# Patient Record
Sex: FEMALE | Race: ASIAN | NOT HISPANIC OR LATINO
[De-identification: names, ages, dates, MRNs, and addresses within clinical notes are randomized per-mention and may not be internally consistent; named-entity substitution may affect disease eponyms.]

---

## 2020-01-29 PROBLEM — Z00.00 ENCOUNTER FOR PREVENTIVE HEALTH EXAMINATION: Status: ACTIVE | Noted: 2020-01-29

## 2021-05-28 ENCOUNTER — RESULT REVIEW (OUTPATIENT)
Age: 71
End: 2021-05-28

## 2021-05-28 ENCOUNTER — OUTPATIENT (OUTPATIENT)
Dept: OUTPATIENT SERVICES | Facility: HOSPITAL | Age: 71
LOS: 1 days | End: 2021-05-28
Payer: MEDICAID

## 2021-05-28 ENCOUNTER — APPOINTMENT (OUTPATIENT)
Dept: ULTRASOUND IMAGING | Facility: IMAGING CENTER | Age: 71
End: 2021-05-28
Payer: MEDICARE

## 2021-05-28 DIAGNOSIS — Z00.8 ENCOUNTER FOR OTHER GENERAL EXAMINATION: ICD-10-CM

## 2021-05-28 PROCEDURE — 76536 US EXAM OF HEAD AND NECK: CPT | Mod: 26

## 2021-05-28 PROCEDURE — 76770 US EXAM ABDO BACK WALL COMP: CPT | Mod: 26

## 2021-05-28 PROCEDURE — 76770 US EXAM ABDO BACK WALL COMP: CPT

## 2021-05-28 PROCEDURE — 76536 US EXAM OF HEAD AND NECK: CPT

## 2021-06-16 ENCOUNTER — APPOINTMENT (OUTPATIENT)
Dept: ULTRASOUND IMAGING | Facility: IMAGING CENTER | Age: 71
End: 2021-06-16
Payer: MEDICARE

## 2021-06-16 ENCOUNTER — RESULT REVIEW (OUTPATIENT)
Age: 71
End: 2021-06-16

## 2021-06-16 ENCOUNTER — OUTPATIENT (OUTPATIENT)
Dept: OUTPATIENT SERVICES | Facility: HOSPITAL | Age: 71
LOS: 1 days | End: 2021-06-16
Payer: COMMERCIAL

## 2021-06-16 DIAGNOSIS — Z00.8 ENCOUNTER FOR OTHER GENERAL EXAMINATION: ICD-10-CM

## 2021-06-16 PROCEDURE — 88173 CYTOPATH EVAL FNA REPORT: CPT

## 2021-06-16 PROCEDURE — 88172 CYTP DX EVAL FNA 1ST EA SITE: CPT

## 2021-06-16 PROCEDURE — 10005 FNA BX W/US GDN 1ST LES: CPT

## 2021-06-16 PROCEDURE — 88173 CYTOPATH EVAL FNA REPORT: CPT | Mod: 26

## 2024-01-03 PROBLEM — M25.561 BILATERAL KNEE PAIN: Status: ACTIVE | Noted: 2024-01-03

## 2024-01-04 ENCOUNTER — APPOINTMENT (OUTPATIENT)
Dept: ORTHOPEDIC SURGERY | Facility: CLINIC | Age: 74
End: 2024-01-04
Payer: MEDICARE

## 2024-01-04 DIAGNOSIS — M17.11 UNILATERAL PRIMARY OSTEOARTHRITIS, RIGHT KNEE: ICD-10-CM

## 2024-01-04 DIAGNOSIS — M25.561 PAIN IN RIGHT KNEE: ICD-10-CM

## 2024-01-04 DIAGNOSIS — M17.0 BILATERAL PRIMARY OSTEOARTHRITIS OF KNEE: ICD-10-CM

## 2024-01-04 DIAGNOSIS — M25.562 PAIN IN RIGHT KNEE: ICD-10-CM

## 2024-01-04 DIAGNOSIS — M21.40 FLAT FOOT [PES PLANUS] (ACQUIRED), UNSPECIFIED FOOT: ICD-10-CM

## 2024-01-04 PROCEDURE — 99204 OFFICE O/P NEW MOD 45 MIN: CPT | Mod: 25

## 2024-01-04 PROCEDURE — 72170 X-RAY EXAM OF PELVIS: CPT

## 2024-01-04 PROCEDURE — 73564 X-RAY EXAM KNEE 4 OR MORE: CPT | Mod: RT

## 2024-01-07 NOTE — HISTORY OF PRESENT ILLNESS
[de-identified] : Jose Sharif is a 73-year-old female who presented, with her daughter, for evaluation of her bilateral knee pain.  Patient has been experiencing bilateral knee pain over the last few years.  Right knee is worse than the left.  Patient had increased walking few days ago and then experienced worsening pain and swelling.  She also had back pain.  Patient has difficulty with sitting, prior, walking, bending the knee, and with stairs.   Pain is located over the medial and lateral knee. Patient has tried diclofenac gel, with some relief.  She has tried Tylenol, with some relief. She has previously tried physical therapy, last in 2020 1/2022.  She has also tried corticosteroid injections, last in 2021, which only worked for 1 to 2 days.  No groin pain. She also reports bilateral foot numbness. Patient is French and English speaking.  History: Bilateral Pes Planus, CKD, HLD, Palpitations

## 2024-01-07 NOTE — DISCUSSION/SUMMARY
[de-identified] : Jose Sharif is a 73-year-old female who presents to the office for evaluation of her bilateral knee pain.  Patient has been experiencing bilateral (right greater than left) knee pain for the last few years.  She has tried physical therapy, pain control, and injections.  X-rays showed severe bilateral knee osteoarthritis.  Examination showed tenderness over the knees, but otherwise good knee range of motion. Discussed with patient the examination and imaging findings.  Discussed with patient the operative and nonoperative management of knee osteoarthritis, including total knee arthroplasty.  Discussed the nonoperative management of knee osteoarthritis, including physical therapy, anti-inflammatories, and injections.  Patient has tried nonoperative management, but continues to have knee pain.  Discussed total knee arthroplasty at length, including surgical procedure, hospital course, DVT prophylaxis, antibiotics, physical therapy, recovery, and risks. Patient would like to proceed with right total knee arthroplasty.  Discussed that patient will require medical clearance prior to surgery.  Discussed obtaining a CT scan prior to surgery.  Patient will follow-up in 4 weeks for reevaluation and management.  Patient understanding and in agreement with the plan.  All questions answered.   Discussed the imaging and physical exam findings with the patient consistent with endstage knee degenerative disease. The patient has failed conservative management including physical therapy, pain control, and injections.  The risks, benefits and alternatives to total knee replacement were discussed with the patient in detail and the patient elected to proceed with surgery. Discussed the surgical plan with the patient including implant options and surgical approach.   Surgical risks including fracture requiring fixation, instability or dislocation, temporary or permanent leg length inequality, infection, bleeding, stiffness, failure to alleviate pain, failure to achieve desired results, need for further surgery, scar tissue formation, hardware failure, chronic pain, injury to nerves resulting in extremity dysfunction, injury to arteries and veins, deep vein thrombosis or pulmonary embolism requiring anticoagulation and medical risk factors including heart attack, stroke, death, neurological injury, pneumonia, kidney or other organ failure were discussed with the patient.   Patient was understanding and in agreement with the treatment plan. All questions answered.  Plan: -CT Scan Right Lower Extremity -Medical Clearance -Follow up in 4 weeks for reevaluation and management -Right total knee arthroplasty  Surgical Plan: Diagnosis: Right Knee Osteoarthritis Laterality: Right Operative procedure: Right Total Knee Arthroplasty Location: [facility]  DVT prophylaxis: Aspirin 325mg twice per day TXA: IV Plan for discharge: Home Pre- & Post Operative Antibiotics: Cefazolin 2g  Clearances:      Medical: Pending  Comorbidities:      Metal Allergy: Negative      Chronic Pain: Negative      Diabetes: Negative       Use of Anticoagulation: Occasional Aspirin      Atrial Fibrillation: Negative      History of VTE: Negative      History of Cardiac Stents: Negative      Skin Infections/Open Wounds: Negative      MRSA Infection/Colonization: Negative      Current Urinary Symptoms: Negative      Immunocompromise: Negative      Inflammatory Arthritis: Negative      Smoking: Negative      Drug Use: Negative      Alcohol Use: Negative      Obstructive Sleep Apnea: Negative      Neurologic Disease: Negative   ADDENDUM: Patient was given a referral to Dr. Roy for follow-up of bilateral pes planus.

## 2024-01-07 NOTE — PHYSICAL EXAM
[de-identified] : Constitutional:  73 year old female, alert and oriented, cooperative, in no acute distress.  HEENT  NC/AT.  Appearance: symmetric  Chest/Respiratory  Respiratory effort: no intercostal retractions or use of accessory muscles. Nonlabored Breathing  Mental Status:  Judgment, insight: intact Orientation: oriented to time, place, and person  Left Knee  Inspection:     Skin intact, no rashes or lesions     No Effusion     Tenderness over the medial joint line and anterior knee.  Range of Motion: 	Extension - 0 degrees 	Flexion - 120 degrees 	Alignment - Varus 3 degrees 	Extensor lag: None  Stability:      Demonstrates no Varus or Valgus instability      Negative Anterior or Posterior drawer.      Negative Lachman's  Patella: stable, tracks well.   Right Knee  Inspection:     Skin intact, no rashes or lesions     No Effusion     Tenderness over the lateral joint line and anterior knee.  Range of Motion: 	Extension - 0 degrees 	Flexion - 120 degrees 	Alignment - Valgus 3 degrees 	Extensor lag: None  Stability:      Demonstrates no Varus or Valgus instability      Negative Anterior or Posterior drawer.      Negative Lachman's  Patella: stable, tracks well.   Neurologic Exam     Motor intact including 5/5 Extensor Hallucis Longus, 5/5 Flexor Hallucis Longus, 5/5 Tibialis Anterior and 5/5 Gastrocnemius     Sensation Intact to Light Touch including Saphenous, Sural, Superficial Peroneal, Deep Peroneal, Tibial nerve distributions  Vascular Exam     Foot is warm and well perfused with 2+ Dorsalis Pedis Pulse   No pain with range of motion of the left hip. No lumbar paraspinal muscle tenderness. There is right hip pain with right hip ROM. There is tenderness over the right greater trochanter. [de-identified] : XRay:  XRays of the Pelvis (1 View) taken in the office today and discussed with the patient. XRays demonstrate no obvious fracture or dislocation. There is no significant evidence of osteoarthritis or osteophyte formation. (my personal interpretation).   XRay: XRays of the Left Knee (4 Views) taken in the office today and reviewed with the patient. XRays demonstrate tricompartmental joint space narrowing, with bone on bone articulations in the medial compartment, with subchondral sclerosis, overlying osteophytes, all consistent with severe osteoarthritis, KL rdGrdrrdarddrderd:rd rd3rd. There is varus alignment. (my personal interpretation)   XRay: XRays of the Right Knee (4 Views) taken in the office today and reviewed with the patient. XRays demonstrate tricompartmental joint space narrowing, worse in the lateral compartment with subchondral sclerosis, overlying osteophytes, all consistent with severe osteoarthritis, KL thGthrthathdtheth:th th4th. There is valgus alignment. (my personal interpretation)

## 2024-01-08 ENCOUNTER — APPOINTMENT (OUTPATIENT)
Dept: RADIOLOGY | Facility: IMAGING CENTER | Age: 74
End: 2024-01-08
Payer: MEDICARE

## 2024-01-08 ENCOUNTER — RESULT REVIEW (OUTPATIENT)
Age: 74
End: 2024-01-08

## 2024-01-08 ENCOUNTER — OUTPATIENT (OUTPATIENT)
Dept: OUTPATIENT SERVICES | Facility: HOSPITAL | Age: 74
LOS: 1 days | End: 2024-01-08
Payer: COMMERCIAL

## 2024-01-08 ENCOUNTER — APPOINTMENT (OUTPATIENT)
Dept: CT IMAGING | Facility: IMAGING CENTER | Age: 74
End: 2024-01-08
Payer: MEDICARE

## 2024-01-08 DIAGNOSIS — M17.11 UNILATERAL PRIMARY OSTEOARTHRITIS, RIGHT KNEE: ICD-10-CM

## 2024-01-08 PROCEDURE — 73700 CT LOWER EXTREMITY W/O DYE: CPT

## 2024-01-08 PROCEDURE — 72170 X-RAY EXAM OF PELVIS: CPT

## 2024-01-08 PROCEDURE — 72170 X-RAY EXAM OF PELVIS: CPT | Mod: 26

## 2024-01-08 PROCEDURE — 73700 CT LOWER EXTREMITY W/O DYE: CPT | Mod: 26,RT

## 2024-01-08 PROCEDURE — 73564 X-RAY EXAM KNEE 4 OR MORE: CPT

## 2024-01-08 PROCEDURE — 73564 X-RAY EXAM KNEE 4 OR MORE: CPT | Mod: 26,LT,RT

## 2024-01-11 ENCOUNTER — APPOINTMENT (OUTPATIENT)
Dept: UROLOGY | Facility: CLINIC | Age: 74
End: 2024-01-11

## 2024-01-16 ENCOUNTER — APPOINTMENT (OUTPATIENT)
Dept: UROLOGY | Facility: CLINIC | Age: 74
End: 2024-01-16

## 2024-01-25 ENCOUNTER — APPOINTMENT (OUTPATIENT)
Dept: UROLOGY | Facility: CLINIC | Age: 74
End: 2024-01-25
Payer: MEDICARE

## 2024-01-25 VITALS
HEIGHT: 62 IN | BODY MASS INDEX: 31.65 KG/M2 | WEIGHT: 172 LBS | HEART RATE: 69 BPM | DIASTOLIC BLOOD PRESSURE: 97 MMHG | SYSTOLIC BLOOD PRESSURE: 128 MMHG | RESPIRATION RATE: 17 BRPM

## 2024-01-25 DIAGNOSIS — R39.16 STRAINING TO VOID: ICD-10-CM

## 2024-01-25 DIAGNOSIS — Z56.0 UNEMPLOYMENT, UNSPECIFIED: ICD-10-CM

## 2024-01-25 DIAGNOSIS — Z78.9 OTHER SPECIFIED HEALTH STATUS: ICD-10-CM

## 2024-01-25 DIAGNOSIS — Z63.4 DISAPPEARANCE AND DEATH OF FAMILY MEMBER: ICD-10-CM

## 2024-01-25 DIAGNOSIS — Q62.39 OTHER OBSTRUCTIVE DEFECTS OF RENAL PELVIS AND URETER: ICD-10-CM

## 2024-01-25 DIAGNOSIS — N13.30 UNSPECIFIED HYDRONEPHROSIS: ICD-10-CM

## 2024-01-25 PROCEDURE — 99204 OFFICE O/P NEW MOD 45 MIN: CPT

## 2024-01-25 RX ORDER — DICLOFENAC SODIUM 50 MG/1
50 TABLET, DELAYED RELEASE ORAL
Refills: 0 | Status: ACTIVE | COMMUNITY

## 2024-01-25 RX ORDER — LISINOPRIL AND HYDROCHLOROTHIAZIDE TABLETS 10; 12.5 MG/1; MG/1
10-12.5 TABLET ORAL
Refills: 0 | Status: ACTIVE | COMMUNITY

## 2024-01-25 RX ORDER — ACETAMINOPHEN 500 MG
500 TABLET ORAL
Refills: 0 | Status: ACTIVE | COMMUNITY

## 2024-01-25 RX ORDER — DICLOFENAC SODIUM 10 MG/G
1 GEL TOPICAL
Refills: 0 | Status: ACTIVE | COMMUNITY

## 2024-01-25 RX ORDER — ATORVASTATIN CALCIUM 10 MG/1
10 TABLET, FILM COATED ORAL
Refills: 0 | Status: ACTIVE | COMMUNITY

## 2024-01-25 RX ORDER — ESCITALOPRAM OXALATE 10 MG/1
10 TABLET ORAL
Refills: 0 | Status: ACTIVE | COMMUNITY

## 2024-01-25 RX ORDER — ASPIRIN 81 MG
81 TABLET, DELAYED RELEASE (ENTERIC COATED) ORAL
Refills: 0 | Status: ACTIVE | COMMUNITY

## 2024-01-25 RX ORDER — CHOLECALCIFEROL (VITAMIN D3) 1250 MCG
1.25 MG TABLET ORAL
Refills: 0 | Status: ACTIVE | COMMUNITY

## 2024-01-25 SDOH — SOCIAL STABILITY - SOCIAL INSECURITY: DISSAPEARANCE AND DEATH OF FAMILY MEMBER: Z63.4

## 2024-01-25 SDOH — ECONOMIC STABILITY - INCOME SECURITY: UNEMPLOYMENT, UNSPECIFIED: Z56.0

## 2024-01-30 NOTE — ASSESSMENT
[FreeTextEntry1] : If no clinically significant obstruction on renal scan, likely nothing to worry about. Also, in setting of some difference in renal split function, not always an immediate indicator for need of repair. Often, these incidental findings don't "matter" in that if renal fxn normal and stable, nothing to worry about as this is likely congenital, but if a functionally obstructive finding, may require monitoring vs immediate intervention, if at all, over time.  Will obtain nuc renal scan to assess for obstruction and to ck differential renal split.

## 2024-01-30 NOTE — LETTER BODY
[Dear  ___] : Dear  [unfilled], [Consult Letter:] : I had the pleasure of evaluating your patient, [unfilled]. [Please see my note below.] : Please see my note below. [Consult Closing:] : Thank you very much for allowing me to participate in the care of this patient.  If you have any questions, please do not hesitate to contact me. [FreeTextEntry1] : Please see my note. I will keep you informed. [FreeTextEntry3] : Sincerely,  Corinna Alfaro MD Clinical  [facility]

## 2024-01-30 NOTE — HISTORY OF PRESENT ILLNESS
[FreeTextEntry1] : KINDRA VARGAS is a 73 year old F who presents with c/o pain and straining to urinate.  This doesn't happen all the time and she does have h/o chronic constipation.  On 10/9/23, Cr was 1.12, and to her knowledge, her labs have been stable over time w no renal issues mentioned to her. UA showed a false pos for hematuria: No RBC's  ON 10/12/23 a ZAC at [facility] showed mild right hydro and a normal bladder.  There is no GH and she denies any prior episode of bleeding, or renal colic.  There is also no report of nausea, loss of appetite or h/o infections.  She then had a CTU done on 10/12/23 at [facility]: showing mild right hydro to a narrowing at upj, c/w upj obstruction. There is reportedly no mass in ureter or concerning filling defects though there is no disc for me to review at this time.

## 2024-02-02 ENCOUNTER — OUTPATIENT (OUTPATIENT)
Dept: OUTPATIENT SERVICES | Facility: HOSPITAL | Age: 74
LOS: 1 days | End: 2024-02-02

## 2024-02-02 VITALS
RESPIRATION RATE: 16 BRPM | SYSTOLIC BLOOD PRESSURE: 138 MMHG | OXYGEN SATURATION: 97 % | DIASTOLIC BLOOD PRESSURE: 84 MMHG | HEART RATE: 71 BPM | HEIGHT: 61 IN | WEIGHT: 166.89 LBS | TEMPERATURE: 98 F

## 2024-02-02 DIAGNOSIS — Z98.49 CATARACT EXTRACTION STATUS, UNSPECIFIED EYE: Chronic | ICD-10-CM

## 2024-02-02 DIAGNOSIS — M17.11 UNILATERAL PRIMARY OSTEOARTHRITIS, RIGHT KNEE: ICD-10-CM

## 2024-02-02 DIAGNOSIS — I10 ESSENTIAL (PRIMARY) HYPERTENSION: ICD-10-CM

## 2024-02-02 DIAGNOSIS — M17.10 UNILATERAL PRIMARY OSTEOARTHRITIS, UNSPECIFIED KNEE: ICD-10-CM

## 2024-02-02 LAB
A1C WITH ESTIMATED AVERAGE GLUCOSE RESULT: 5.4 % — SIGNIFICANT CHANGE UP (ref 4–5.6)
ANION GAP SERPL CALC-SCNC: 11 MMOL/L — SIGNIFICANT CHANGE UP (ref 7–14)
APPEARANCE UR: CLEAR — SIGNIFICANT CHANGE UP
BACTERIA # UR AUTO: NEGATIVE /HPF — SIGNIFICANT CHANGE UP
BILIRUB UR-MCNC: NEGATIVE — SIGNIFICANT CHANGE UP
BLD GP AB SCN SERPL QL: NEGATIVE — SIGNIFICANT CHANGE UP
BUN SERPL-MCNC: 20 MG/DL — SIGNIFICANT CHANGE UP (ref 7–23)
CALCIUM SERPL-MCNC: 9.7 MG/DL — SIGNIFICANT CHANGE UP (ref 8.4–10.5)
CAST: 0 /LPF — SIGNIFICANT CHANGE UP (ref 0–4)
CHLORIDE SERPL-SCNC: 104 MMOL/L — SIGNIFICANT CHANGE UP (ref 98–107)
CO2 SERPL-SCNC: 26 MMOL/L — SIGNIFICANT CHANGE UP (ref 22–31)
COLOR SPEC: YELLOW — SIGNIFICANT CHANGE UP
CREAT SERPL-MCNC: 1.05 MG/DL — SIGNIFICANT CHANGE UP (ref 0.5–1.3)
DIFF PNL FLD: ABNORMAL
EGFR: 57 ML/MIN/1.73M2 — LOW
ESTIMATED AVERAGE GLUCOSE: 108 — SIGNIFICANT CHANGE UP
GLUCOSE SERPL-MCNC: 83 MG/DL — SIGNIFICANT CHANGE UP (ref 70–99)
GLUCOSE UR QL: NEGATIVE MG/DL — SIGNIFICANT CHANGE UP
HCT VFR BLD CALC: 39.3 % — SIGNIFICANT CHANGE UP (ref 34.5–45)
HGB BLD-MCNC: 13.2 G/DL — SIGNIFICANT CHANGE UP (ref 11.5–15.5)
KETONES UR-MCNC: NEGATIVE MG/DL — SIGNIFICANT CHANGE UP
LEUKOCYTE ESTERASE UR-ACNC: NEGATIVE — SIGNIFICANT CHANGE UP
MCHC RBC-ENTMCNC: 29.5 PG — SIGNIFICANT CHANGE UP (ref 27–34)
MCHC RBC-ENTMCNC: 33.6 GM/DL — SIGNIFICANT CHANGE UP (ref 32–36)
MCV RBC AUTO: 87.9 FL — SIGNIFICANT CHANGE UP (ref 80–100)
NITRITE UR-MCNC: NEGATIVE — SIGNIFICANT CHANGE UP
NRBC # BLD: 0 /100 WBCS — SIGNIFICANT CHANGE UP (ref 0–0)
NRBC # FLD: 0 K/UL — SIGNIFICANT CHANGE UP (ref 0–0)
PH UR: 6.5 — SIGNIFICANT CHANGE UP (ref 5–8)
PLATELET # BLD AUTO: 212 K/UL — SIGNIFICANT CHANGE UP (ref 150–400)
POTASSIUM SERPL-MCNC: 4 MMOL/L — SIGNIFICANT CHANGE UP (ref 3.5–5.3)
POTASSIUM SERPL-SCNC: 4 MMOL/L — SIGNIFICANT CHANGE UP (ref 3.5–5.3)
PROT UR-MCNC: NEGATIVE MG/DL — SIGNIFICANT CHANGE UP
RBC # BLD: 4.47 M/UL — SIGNIFICANT CHANGE UP (ref 3.8–5.2)
RBC # FLD: 13.9 % — SIGNIFICANT CHANGE UP (ref 10.3–14.5)
RBC CASTS # UR COMP ASSIST: 9 /HPF — HIGH (ref 0–4)
RH IG SCN BLD-IMP: POSITIVE — SIGNIFICANT CHANGE UP
SODIUM SERPL-SCNC: 141 MMOL/L — SIGNIFICANT CHANGE UP (ref 135–145)
SP GR SPEC: 1.02 — SIGNIFICANT CHANGE UP (ref 1–1.03)
SQUAMOUS # UR AUTO: 2 /HPF — SIGNIFICANT CHANGE UP (ref 0–5)
UROBILINOGEN FLD QL: 0.2 MG/DL — SIGNIFICANT CHANGE UP (ref 0.2–1)
WBC # BLD: 5.81 K/UL — SIGNIFICANT CHANGE UP (ref 3.8–10.5)
WBC # FLD AUTO: 5.81 K/UL — SIGNIFICANT CHANGE UP (ref 3.8–10.5)
WBC UR QL: 1 /HPF — SIGNIFICANT CHANGE UP (ref 0–5)

## 2024-02-02 RX ORDER — CIPROFLOXACIN HCL 0.3 %
1 DROPS OPHTHALMIC (EYE)
Refills: 0 | DISCHARGE

## 2024-02-02 RX ORDER — SODIUM CHLORIDE 9 MG/ML
1000 INJECTION, SOLUTION INTRAVENOUS
Refills: 0 | Status: DISCONTINUED | OUTPATIENT
Start: 2024-02-07 | End: 2024-02-08

## 2024-02-02 RX ORDER — CHLORHEXIDINE GLUCONATE 213 G/1000ML
1 SOLUTION TOPICAL DAILY
Refills: 0 | Status: DISCONTINUED | OUTPATIENT
Start: 2024-02-07 | End: 2024-02-08

## 2024-02-02 NOTE — H&P PST ADULT - NSICDXPASTMEDICALHX_GEN_ALL_CORE_FT
PAST MEDICAL HISTORY:  Arthritis     Hypertension      PAST MEDICAL HISTORY:  Arthritis     Cataract, left     Hypertension     Obesity

## 2024-02-02 NOTE — H&P PST ADULT - ASSESSMENT
74 y/o female with hx of  bilateral knee pain x several years.  Dx with osteoarthritis.  Scheduled for right Total knee Arthoplasty with TERESA

## 2024-02-02 NOTE — H&P PST ADULT - LANGUAGE ASSISTANCE NEEDED
pt prefers to communicate in english/No-Patient/Caregiver offered and refused free interpretation services. No-Patient/Caregiver offered and refused free interpretation services.

## 2024-02-02 NOTE — H&P PST ADULT - ATTENDING COMMENTS
Jose Sharif is a 73-year-old female, past medical history of pes planus, CKD, hyperlipidemia, palpitations, who presented for evaluation of her bilateral (right greater than left) knee pain. Patient has been experiencing bilateral knee pain over the last few years. Patient has difficulty with sitting, prior, walking, bending the knee, and with stairs. Pain is located over the medial and lateral knee. Patient has tried diclofenac gel, with some relief. She has tried Tylenol, with some relief. She has previously tried physical therapy and tried corticosteroid injections. XRays showed severe right knee osteoarthritis. Patient was indicated for a Right Total Knee Arthroplasty. Patient was cleared by Medicine.    Discussed the operative and nonoperative management of knee osteoarthritis at length with the patient. Nonoperative management would consist of pain control, physical therapy, and anti-inflammatories. The patient had failed conservative management, including pain control, physical therapy, and injections. The patient did not want to continue nonoperative management due to the impact knee pain has had on the patient’s quality of life. Operative management would consist of total knee arthroplasty. The risks, benefits and alternatives to total knee arthroplasty were discussed with the patient in detail and the patient elected to proceed with surgery. Discussed the surgical plan and implants with the patient, including robotic assisted total knee arthroplasty. Discussed the recovery process following total knee arthroplasty at length, including, but not limited to, inpatient hospital stay, physical therapy, recovery, antibiotics, and DVT prophylaxis. Surgical risks including fracture requiring fixation, instability or dislocation, temporary or permanent leg length inequality, risks of cementation, infection, bleeding, stiffness, failure to alleviate pain, failure to achieve desired results, need for further surgery, scar tissue formation, hardware failure, component loosening, chronic pain, injury to nerves resulting in extremity dysfunction, injury to arteries and veins, deep vein thrombosis or pulmonary embolism requiring anticoagulation and medical risk factors including heart attack, stroke, death, neurological injury, pneumonia, kidney or other organ failure were discussed with the patient at length.    Following discussion, patient elected to proceed with Right Total Knee Arthroplasty with [employer] Robotic Assistance. Informed consent was obtained. Patient's leg was then marked with verbal confirmation of the patient. Patient was understanding and in agreement with the operative plan. All questions were answered.      Assessment/Plan:  Jose Sharif is a 73 year old female who presented for a Right Total Knee Arthroplasty with [employer] Assistance    Plan:  -Right Total Knee Arthroplasty with [employer] Assistance  -Clearance in Chart

## 2024-02-02 NOTE — H&P PST ADULT - NSANTHOSAYNRD_GEN_A_CORE
No. GILL screening performed.  STOP BANG Legend: 0-2 = LOW Risk; 3-4 = INTERMEDIATE Risk; 5-8 = HIGH Risk

## 2024-02-02 NOTE — H&P PST ADULT - HISTORY OF PRESENT ILLNESS
72 y/o female with hx of  bilateral knee pain x several years.  Dx with osteoarthritis.  Scheduled for right Total knee Arthoplasty with TERESA

## 2024-02-02 NOTE — H&P PST ADULT - CARDIOVASCULAR COMMENTS
DASI score METS 4.30 without CP no SOB, no palpitations DASI score 4.30 METS without CP no SOB, no palpitations

## 2024-02-02 NOTE — H&P PST ADULT - PROBLEM SELECTOR PLAN 1
Right Total knee Arthoplasty with TERESA 2/7/24    CBC BMP HgbA1C T&S , ABO, MRSA swab, UA CS EKG on chart    preop Ortho surg instructions given and explained    Preop instructions and antibacterial soap given and explained (verbal and written), with teach back.     GILL precautions, OR booking informed    Pt reports she was seen by PMD for preop eval, on chart

## 2024-02-03 LAB
CULTURE RESULTS: SIGNIFICANT CHANGE UP
MRSA PCR RESULT.: SIGNIFICANT CHANGE UP
RH IG SCN BLD-IMP: POSITIVE — SIGNIFICANT CHANGE UP
S AUREUS DNA NOSE QL NAA+PROBE: SIGNIFICANT CHANGE UP
SPECIMEN SOURCE: SIGNIFICANT CHANGE UP

## 2024-02-06 ENCOUNTER — TRANSCRIPTION ENCOUNTER (OUTPATIENT)
Age: 74
End: 2024-02-06

## 2024-02-06 NOTE — ASU PATIENT PROFILE, ADULT - FALL HARM RISK - HARM RISK INTERVENTIONS

## 2024-02-07 ENCOUNTER — INPATIENT (INPATIENT)
Facility: HOSPITAL | Age: 74
LOS: 0 days | Discharge: HOME HEALTH | End: 2024-02-08
Attending: STUDENT IN AN ORGANIZED HEALTH CARE EDUCATION/TRAINING PROGRAM | Admitting: STUDENT IN AN ORGANIZED HEALTH CARE EDUCATION/TRAINING PROGRAM
Payer: MEDICARE

## 2024-02-07 ENCOUNTER — TRANSCRIPTION ENCOUNTER (OUTPATIENT)
Age: 74
End: 2024-02-07

## 2024-02-07 ENCOUNTER — APPOINTMENT (OUTPATIENT)
Dept: ORTHOPEDIC SURGERY | Facility: HOSPITAL | Age: 74
End: 2024-02-07

## 2024-02-07 VITALS
OXYGEN SATURATION: 100 % | SYSTOLIC BLOOD PRESSURE: 153 MMHG | RESPIRATION RATE: 16 BRPM | TEMPERATURE: 98 F | DIASTOLIC BLOOD PRESSURE: 80 MMHG | HEART RATE: 73 BPM | HEIGHT: 61 IN | WEIGHT: 166.89 LBS

## 2024-02-07 DIAGNOSIS — M17.11 UNILATERAL PRIMARY OSTEOARTHRITIS, RIGHT KNEE: ICD-10-CM

## 2024-02-07 DIAGNOSIS — Z98.49 CATARACT EXTRACTION STATUS, UNSPECIFIED EYE: Chronic | ICD-10-CM

## 2024-02-07 PROBLEM — I10 ESSENTIAL (PRIMARY) HYPERTENSION: Chronic | Status: ACTIVE | Noted: 2024-02-02

## 2024-02-07 PROBLEM — E66.9 OBESITY, UNSPECIFIED: Chronic | Status: ACTIVE | Noted: 2024-02-02

## 2024-02-07 PROBLEM — M19.90 UNSPECIFIED OSTEOARTHRITIS, UNSPECIFIED SITE: Chronic | Status: ACTIVE | Noted: 2024-02-02

## 2024-02-07 LAB — GLUCOSE BLDC GLUCOMTR-MCNC: 102 MG/DL — HIGH (ref 70–99)

## 2024-02-07 PROCEDURE — 0055T BONE SRGRY CMPTR CT/MRI IMAG: CPT

## 2024-02-07 PROCEDURE — 27447 TOTAL KNEE ARTHROPLASTY: CPT | Mod: RT

## 2024-02-07 PROCEDURE — 73560 X-RAY EXAM OF KNEE 1 OR 2: CPT | Mod: 26,RT

## 2024-02-07 DEVICE — CEMENT SIMPLEX P 40GM: Type: IMPLANTABLE DEVICE | Site: RIGHT | Status: FUNCTIONAL

## 2024-02-07 DEVICE — BASEPLATE TIB UNIV TRIATHLON SZ 2: Type: IMPLANTABLE DEVICE | Site: RIGHT | Status: FUNCTIONAL

## 2024-02-07 DEVICE — INSERT TIB BEARING CS X3 SZ 2 11MM: Type: IMPLANTABLE DEVICE | Site: RIGHT | Status: FUNCTIONAL

## 2024-02-07 DEVICE — MAKO BONE PIN 3.2MM X 140MM: Type: IMPLANTABLE DEVICE | Site: RIGHT | Status: FUNCTIONAL

## 2024-02-07 DEVICE — COMP FEM TRIATHLON CR SZ 2 RT: Type: IMPLANTABLE DEVICE | Site: RIGHT | Status: FUNCTIONAL

## 2024-02-07 DEVICE — IMP PATELLA ASYMMETRIC X3 29X9MM: Type: IMPLANTABLE DEVICE | Site: RIGHT | Status: FUNCTIONAL

## 2024-02-07 DEVICE — MAKO BONE PIN 3.2MM X 110MM: Type: IMPLANTABLE DEVICE | Site: RIGHT | Status: FUNCTIONAL

## 2024-02-07 RX ORDER — OXYCODONE HYDROCHLORIDE 5 MG/1
10 TABLET ORAL
Refills: 0 | Status: DISCONTINUED | OUTPATIENT
Start: 2024-02-07 | End: 2024-02-08

## 2024-02-07 RX ORDER — SODIUM CHLORIDE 9 MG/ML
500 INJECTION, SOLUTION INTRAVENOUS ONCE
Refills: 0 | Status: COMPLETED | OUTPATIENT
Start: 2024-02-07 | End: 2024-02-07

## 2024-02-07 RX ORDER — POLYETHYLENE GLYCOL 3350 17 G/17G
17 POWDER, FOR SOLUTION ORAL AT BEDTIME
Refills: 0 | Status: DISCONTINUED | OUTPATIENT
Start: 2024-02-07 | End: 2024-02-08

## 2024-02-07 RX ORDER — PANTOPRAZOLE SODIUM 20 MG/1
40 TABLET, DELAYED RELEASE ORAL ONCE
Refills: 0 | Status: COMPLETED | OUTPATIENT
Start: 2024-02-07 | End: 2024-02-07

## 2024-02-07 RX ORDER — ONDANSETRON 8 MG/1
4 TABLET, FILM COATED ORAL EVERY 6 HOURS
Refills: 0 | Status: DISCONTINUED | OUTPATIENT
Start: 2024-02-07 | End: 2024-02-08

## 2024-02-07 RX ORDER — TRAMADOL HYDROCHLORIDE 50 MG/1
50 TABLET ORAL ONCE
Refills: 0 | Status: DISCONTINUED | OUTPATIENT
Start: 2024-02-07 | End: 2024-02-07

## 2024-02-07 RX ORDER — ACETAMINOPHEN 500 MG
1000 TABLET ORAL ONCE
Refills: 0 | Status: COMPLETED | OUTPATIENT
Start: 2024-02-07 | End: 2024-02-07

## 2024-02-07 RX ORDER — KETOROLAC TROMETHAMINE 0.5 %
1 DROPS OPHTHALMIC (EYE)
Refills: 0 | Status: DISCONTINUED | OUTPATIENT
Start: 2024-02-07 | End: 2024-02-08

## 2024-02-07 RX ORDER — CEFAZOLIN SODIUM 1 G
2000 VIAL (EA) INJECTION EVERY 8 HOURS
Refills: 0 | Status: COMPLETED | OUTPATIENT
Start: 2024-02-07 | End: 2024-02-08

## 2024-02-07 RX ORDER — TRAMADOL HYDROCHLORIDE 50 MG/1
50 TABLET ORAL EVERY 6 HOURS
Refills: 0 | Status: DISCONTINUED | OUTPATIENT
Start: 2024-02-07 | End: 2024-02-08

## 2024-02-07 RX ORDER — SODIUM CHLORIDE 9 MG/ML
500 INJECTION, SOLUTION INTRAVENOUS ONCE
Refills: 0 | Status: COMPLETED | OUTPATIENT
Start: 2024-02-07 | End: 2024-02-08

## 2024-02-07 RX ORDER — SENNA PLUS 8.6 MG/1
2 TABLET ORAL AT BEDTIME
Refills: 0 | Status: DISCONTINUED | OUTPATIENT
Start: 2024-02-07 | End: 2024-02-08

## 2024-02-07 RX ORDER — ATORVASTATIN CALCIUM 80 MG/1
10 TABLET, FILM COATED ORAL AT BEDTIME
Refills: 0 | Status: DISCONTINUED | OUTPATIENT
Start: 2024-02-07 | End: 2024-02-08

## 2024-02-07 RX ORDER — ASPIRIN/CALCIUM CARB/MAGNESIUM 324 MG
325 TABLET ORAL
Refills: 0 | Status: DISCONTINUED | OUTPATIENT
Start: 2024-02-07 | End: 2024-02-08

## 2024-02-07 RX ORDER — ONDANSETRON 8 MG/1
4 TABLET, FILM COATED ORAL ONCE
Refills: 0 | Status: DISCONTINUED | OUTPATIENT
Start: 2024-02-07 | End: 2024-02-07

## 2024-02-07 RX ORDER — LISINOPRIL 2.5 MG/1
10 TABLET ORAL DAILY
Refills: 0 | Status: DISCONTINUED | OUTPATIENT
Start: 2024-02-07 | End: 2024-02-08

## 2024-02-07 RX ORDER — ACETAMINOPHEN 500 MG
975 TABLET ORAL EVERY 8 HOURS
Refills: 0 | Status: DISCONTINUED | OUTPATIENT
Start: 2024-02-08 | End: 2024-02-08

## 2024-02-07 RX ORDER — PREDNISOLONE SODIUM PHOSPHATE 1 %
1 DROPS OPHTHALMIC (EYE)
Refills: 0 | Status: DISCONTINUED | OUTPATIENT
Start: 2024-02-07 | End: 2024-02-08

## 2024-02-07 RX ORDER — PANTOPRAZOLE SODIUM 20 MG/1
40 TABLET, DELAYED RELEASE ORAL
Refills: 0 | Status: DISCONTINUED | OUTPATIENT
Start: 2024-02-07 | End: 2024-02-08

## 2024-02-07 RX ORDER — DEXAMETHASONE 0.5 MG/5ML
8 ELIXIR ORAL ONCE
Refills: 0 | Status: COMPLETED | OUTPATIENT
Start: 2024-02-08 | End: 2024-02-08

## 2024-02-07 RX ORDER — KETOROLAC TROMETHAMINE 30 MG/ML
15 SYRINGE (ML) INJECTION EVERY 6 HOURS
Refills: 0 | Status: DISCONTINUED | OUTPATIENT
Start: 2024-02-07 | End: 2024-02-08

## 2024-02-07 RX ORDER — OXYCODONE HYDROCHLORIDE 5 MG/1
5 TABLET ORAL ONCE
Refills: 0 | Status: DISCONTINUED | OUTPATIENT
Start: 2024-02-07 | End: 2024-02-07

## 2024-02-07 RX ORDER — HYDROMORPHONE HYDROCHLORIDE 2 MG/ML
0.5 INJECTION INTRAMUSCULAR; INTRAVENOUS; SUBCUTANEOUS
Refills: 0 | Status: DISCONTINUED | OUTPATIENT
Start: 2024-02-07 | End: 2024-02-07

## 2024-02-07 RX ORDER — HYDROMORPHONE HYDROCHLORIDE 2 MG/ML
0.5 INJECTION INTRAMUSCULAR; INTRAVENOUS; SUBCUTANEOUS ONCE
Refills: 0 | Status: DISCONTINUED | OUTPATIENT
Start: 2024-02-07 | End: 2024-02-08

## 2024-02-07 RX ORDER — ACETAMINOPHEN 500 MG
1000 TABLET ORAL ONCE
Refills: 0 | Status: COMPLETED | OUTPATIENT
Start: 2024-02-08 | End: 2024-02-08

## 2024-02-07 RX ORDER — OXYCODONE HYDROCHLORIDE 5 MG/1
5 TABLET ORAL
Refills: 0 | Status: DISCONTINUED | OUTPATIENT
Start: 2024-02-07 | End: 2024-02-08

## 2024-02-07 RX ADMIN — SODIUM CHLORIDE 30 MILLILITER(S): 9 INJECTION, SOLUTION INTRAVENOUS at 07:40

## 2024-02-07 RX ADMIN — Medication 400 MILLIGRAM(S): at 18:37

## 2024-02-07 RX ADMIN — Medication 15 MILLIGRAM(S): at 17:03

## 2024-02-07 RX ADMIN — SODIUM CHLORIDE 500 MILLILITER(S): 9 INJECTION, SOLUTION INTRAVENOUS at 17:04

## 2024-02-07 RX ADMIN — CHLORHEXIDINE GLUCONATE 1 APPLICATION(S): 213 SOLUTION TOPICAL at 07:02

## 2024-02-07 RX ADMIN — Medication 15 MILLIGRAM(S): at 23:37

## 2024-02-07 RX ADMIN — SODIUM CHLORIDE 500 MILLILITER(S): 9 INJECTION, SOLUTION INTRAVENOUS at 11:30

## 2024-02-07 RX ADMIN — Medication 15 MILLIGRAM(S): at 17:20

## 2024-02-07 RX ADMIN — Medication 100 MILLIGRAM(S): at 17:03

## 2024-02-07 RX ADMIN — Medication 1 DROP(S): at 14:20

## 2024-02-07 RX ADMIN — Medication 1000 MILLIGRAM(S): at 18:57

## 2024-02-07 RX ADMIN — OXYCODONE HYDROCHLORIDE 5 MILLIGRAM(S): 5 TABLET ORAL at 12:04

## 2024-02-07 RX ADMIN — Medication 1 DROP(S): at 22:08

## 2024-02-07 RX ADMIN — PANTOPRAZOLE SODIUM 40 MILLIGRAM(S): 20 TABLET, DELAYED RELEASE ORAL at 06:59

## 2024-02-07 RX ADMIN — Medication 325 MILLIGRAM(S): at 18:37

## 2024-02-07 RX ADMIN — TRAMADOL HYDROCHLORIDE 50 MILLIGRAM(S): 50 TABLET ORAL at 07:00

## 2024-02-07 RX ADMIN — OXYCODONE HYDROCHLORIDE 5 MILLIGRAM(S): 5 TABLET ORAL at 13:00

## 2024-02-07 RX ADMIN — SODIUM CHLORIDE 30 MILLILITER(S): 9 INJECTION, SOLUTION INTRAVENOUS at 17:05

## 2024-02-07 NOTE — DISCHARGE NOTE PROVIDER - NSDCCPCAREPLAN_GEN_ALL_CORE_FT
PRINCIPAL DISCHARGE DIAGNOSIS  Diagnosis: Primary osteoarthritis of knee  Assessment and Plan of Treatment: Diet: Continue regular diet upon discharge.   Activity: No heavy lifting > 25 lbs for 4 weeks. Avoid straining or excessive activity x 6 weeks.   -Continue to use your walker when ambulating until your postoperative follow up appointment.   Dressings: Keep dressing clean, dry, and intact. Your doctor will remove your bandage at your post-operative follow up appointment.   Other Care:   -You may shower when you get home but DO NOT soak dressing and/or incision. The water may run over your dressing/incision but DO NOT let the water directly hit your dressing/incision (take a shower with your wound away from the direct stream of water). NO hot tubes, NO bath rubs, NO swimming pools.   -Elevate your operative leg 2 feet above heart level for 2 hours in the morning, 2 hours in the afternoon, and 2 hours in the evening.   -Apply ice for 20min every time you elevate.   -Sit for 90 min/day: 45mins x2 or 30min x3  -DO NOT sit for more than the 90min/day. Walk or lay down when not elevating your leg.   -DO NOT place the elevation pillow behind your knees. Only place it under your calf and heel.   -DO NOT bend more than 45 degrees at the waist

## 2024-02-07 NOTE — DISCHARGE NOTE PROVIDER - NSDCFUSCHEDAPPT_GEN_ALL_CORE_FT
Vivek Blue  [facility] Physician [facility]  ORTHOSUR [address] [employer]  Scheduled Appointment: 02/22/2024

## 2024-02-07 NOTE — PHYSICAL THERAPY INITIAL EVALUATION ADULT - RANGE OF MOTION EXAMINATION, REHAB EVAL
right hip/ankle: within functional limits, right knee flexion, 0-90 degrees/bilateral upper extremity ROM was WFL (within functional limits)/Left LE ROM was WFL (within functional limits)/deficits as listed below

## 2024-02-07 NOTE — PHYSICAL THERAPY INITIAL EVALUATION ADULT - NSPTDISCHREC_GEN_A_CORE
Anticipate discharge home however at this time patient is not yet functionally cleared from Physical Therapy perspective due to stair mobility and car transfers. Home with home PT services to address current functional limitations to optimize safety within the home environment with the use of a rolling walker./Home PT

## 2024-02-07 NOTE — PHYSICAL THERAPY INITIAL EVALUATION ADULT - ADDITIONAL COMMENTS
Pt lives with her daughter in a house with 2 stairs to enter and 10 steps to the bedroom. prior to admission Pt stated that she was independent with all mobility and ambulated with a rollator.     Pt. left comfortable in bed with all tubes/lines intact, head of the bed elevated, +bed alarm, call bell in reach and in NAD. RN aware of session and pt current position.

## 2024-02-07 NOTE — DISCHARGE NOTE PROVIDER - NSDCCPTREATMENT_GEN_ALL_CORE_FT
PRINCIPAL PROCEDURE  Procedure: Right total knee replacement  Findings and Treatment: Pain control:    Standing:         -Acetaminophen 500mg - 2 tabs every 8 hours  As needed:        -Tramadol 50mg - 1 tab every 6 hours - Take only if needed for MODERATE pain       -oxycodone 5mg - 1 tab every 4-6 hours - Take only if needed for SEVERE or BREAKTHROUGH pain  Oxycodone and Tramadol have been sent to your pharmacy. Please do not drive, operate machinery, or make important decisions while taking these medications.   Other Medications: (Standing)  -Aspirin (Enteric Coated) 325mg every 12 hours - to prevent blood clots (for 6 weeks post operatively.)  -Protonix 40mg - 1 tab every 24 hours - to prevent stomach irritation/ulcers  -Senna 8.6mg - 2 pills every 24 hours - stool softener  -Miralax 17g - daily - constipation   Follow up: Please follow up at your prescheduled post-operative follow up appointment with Dr. Blue for 2 weeks after hospital discharge. Please call with any questions or concerns including fevers, worsening pain, pus from the wounds, redness of the skin and difficulty breathing or heaviness in the chest at [phone].

## 2024-02-07 NOTE — DISCHARGE NOTE PROVIDER - NSDCMRMEDTOKEN_GEN_ALL_CORE_FT
aspirin 81 mg oral tablet: 1 tab(s) orally once a day last dose 1 week ago  atorvastatin 10 mg oral tablet: 1 tab(s) orally  cholecalciferol 1250 mcg (50,000 intl units) oral capsule: 1 cap(s) orally once a week  diclofenac sodium 50 mg oral delayed release tablet: 1 tab(s) orally every 12 hours last dose 1 week ago  ketorolac 0.5% ophthalmic solution: 1 drop(s) in each affected eye 2 times a day left eye  lisinopril-hydrochlorothiazide 10 mg-12.5 mg oral tablet: 1 tab(s) orally once a day in PM  prednisoLONE acetate 1% ophthalmic suspension: 1 drop(s) in each affected eye 4 times a day left eye   acetaminophen 325 mg oral tablet: 3 tab(s) orally every 8 hours  aspirin 325 mg oral tablet: 1 tab(s) orally 2 times a day  atorvastatin 10 mg oral tablet: 1 tab(s) orally  cholecalciferol 1250 mcg (50,000 intl units) oral capsule: 1 cap(s) orally once a week  ketorolac 0.5% ophthalmic solution: 1 drop(s) in each affected eye 2 times a day left eye  lisinopril 10 mg oral tablet: 1 tab(s) orally once a day  lisinopril-hydrochlorothiazide 10 mg-12.5 mg oral tablet: 1 tab(s) orally once a day in PM  naloxone 4 mg/0.1 mL nasal spray: 4 milligram(s) intranasally once as needed for opioid reversal  naproxen 500 mg oral delayed release tablet: 1 tab(s) orally 2 times a day  oxyCODONE 5 mg oral tablet: 1 tab(s) orally every 6 hours as needed for Severe Pain (7 - 10) MDD: 4  pantoprazole 40 mg oral delayed release tablet: 1 tab(s) orally once a day (before a meal)  polyethylene glycol 3350 oral powder for reconstitution: 17 gram(s) orally once a day (at bedtime)  prednisoLONE acetate 1% ophthalmic suspension: 1 drop(s) in each affected eye 4 times a day left eye  senna leaf extract oral tablet: 2 tab(s) orally once a day (at bedtime)  traMADol 50 mg oral tablet: 1 tab(s) orally every 6 hours as needed for Mild Pain (1 - 3) MDD: 4

## 2024-02-07 NOTE — DISCHARGE NOTE PROVIDER - HOSPITAL COURSE
This is a 74yo Female with PMH of HTN who presents to [facility] for orthopedic surgery. Patient s/p right TKA with Dr. Blue on 2/7/2024. Patient tolerated the procedure well without any intraoperative complications. Patient tolerated physical therapy well, and the pain was controlled. Patient is weight bearing as tolerated with cane/walker as needed. Seen by medical attending for continuity of care and management and cleared for safe discharge. Keep dressing/incision clean, dry and intact. Any suture/staples to be removed on post-op day #14 at your office visit. Patient is on 325mg of ASA for DVT prophylaxis, please take for 6 weeks unless otherwise instructed by your surgeon. Please follow up with Dr. Blue in 2 weeks. Please follow up with your PMD for continuity of care and management as medications may have changed.

## 2024-02-07 NOTE — ASU PREOP CHECKLIST - SELECT TESTS ORDERED
fs at/BMP/CBC/Type and Cross/Type and Screen/POCT Blood Glucose fs at 6:22 am-102/BMP/CBC/Type and Cross/Type and Screen/POCT Blood Glucose

## 2024-02-07 NOTE — PHYSICAL THERAPY INITIAL EVALUATION ADULT - MANUAL MUSCLE TESTING RESULTS, REHAB EVAL
bilateral upper extremities: at least 3/5, left lower extremity: at least 3/5, right lower extremity: 3-/5/grossly assessed due to

## 2024-02-07 NOTE — PATIENT PROFILE ADULT - FALL HARM RISK - HARM RISK INTERVENTIONS
Assistance with ambulation/Assistance OOB with selected safe patient handling equipment/Communicate Risk of Fall with Harm to all staff/Discuss with provider need for PT consult/Monitor gait and stability/Provide patient with walking aids - walker, cane, crutches/Reinforce activity limits and safety measures with patient and family/Sit up slowly, dangle for a short time, stand at bedside before walking/Tailored Fall Risk Interventions/Use of alarms - bed, chair and/or voice tab/Visual Cue: Yellow wristband and red socks/Bed in lowest position, wheels locked, appropriate side rails in place/Call bell, personal items and telephone in reach/Instruct patient to call for assistance before getting out of bed or chair/Non-slip footwear when patient is out of bed/[place] to call system/Physically safe environment - no spills, clutter or unnecessary equipment/Purposeful Proactive Rounding/Room/bathroom lighting operational, light cord in reach

## 2024-02-07 NOTE — PHYSICAL THERAPY INITIAL EVALUATION ADULT - PHYSICAL ASSIST/NONPHYSICAL ASSIST: SIT/SUPINE, REHAB EVAL
Ss note:9/9/2021.3:54 PM  Pt is COVID POSITIVE on 9-8-2021. ACP completed. Pt is from [facility] [place] [facility]AVEZ and is unable to return to this SNF until 10 days post covid diagnosis per diaz Thompson Adjutant. SW/CM had investigated possible SNF options for new positive covid pts however [facility] full, liaison will follow, Benjie Nur, [address] [id number] and [facility] cannot accept. Dtr Myron Rahman was updated on above and relays she was paying to hold the bed at [facility] prior to pt becoming covid positive. Sw contacted diaz Fowlerutant at [facility] to reach out to the dtr Myron Rahman as dtr indicated she wants to continue to pay to hold the bed at [facility]. Sw will await return call from [employer] on status of bed hold. SW/CM will continue to follow and assist for SNF placement.  LD Raman verbal cues

## 2024-02-07 NOTE — DISCHARGE NOTE PROVIDER - CARE PROVIDER_API CALL
Vivek Blue  Orthopaedic Surgery  [address] [place] [address], [facility] [address]  [place] [address]  Phone: [phone]  Fax: [phone]  Established Patient  Follow Up Time:

## 2024-02-08 ENCOUNTER — TRANSCRIPTION ENCOUNTER (OUTPATIENT)
Age: 74
End: 2024-02-08

## 2024-02-08 VITALS
TEMPERATURE: 97 F | DIASTOLIC BLOOD PRESSURE: 64 MMHG | HEART RATE: 95 BPM | RESPIRATION RATE: 17 BRPM | OXYGEN SATURATION: 98 % | SYSTOLIC BLOOD PRESSURE: 128 MMHG

## 2024-02-08 PROBLEM — H26.9 UNSPECIFIED CATARACT: Chronic | Status: ACTIVE | Noted: 2024-02-02

## 2024-02-08 LAB
ANION GAP SERPL CALC-SCNC: 11 MMOL/L — SIGNIFICANT CHANGE UP (ref 7–14)
BUN SERPL-MCNC: 13 MG/DL — SIGNIFICANT CHANGE UP (ref 7–23)
CALCIUM SERPL-MCNC: 9 MG/DL — SIGNIFICANT CHANGE UP (ref 8.4–10.5)
CHLORIDE SERPL-SCNC: 105 MMOL/L — SIGNIFICANT CHANGE UP (ref 98–107)
CO2 SERPL-SCNC: 23 MMOL/L — SIGNIFICANT CHANGE UP (ref 22–31)
CREAT SERPL-MCNC: 1.01 MG/DL — SIGNIFICANT CHANGE UP (ref 0.5–1.3)
EGFR: 60 ML/MIN/1.73M2 — SIGNIFICANT CHANGE UP
GLUCOSE SERPL-MCNC: 123 MG/DL — HIGH (ref 70–99)
HCT VFR BLD CALC: 35.4 % — SIGNIFICANT CHANGE UP (ref 34.5–45)
HGB BLD-MCNC: 12.2 G/DL — SIGNIFICANT CHANGE UP (ref 11.5–15.5)
MCHC RBC-ENTMCNC: 29.5 PG — SIGNIFICANT CHANGE UP (ref 27–34)
MCHC RBC-ENTMCNC: 34.5 GM/DL — SIGNIFICANT CHANGE UP (ref 32–36)
MCV RBC AUTO: 85.5 FL — SIGNIFICANT CHANGE UP (ref 80–100)
NRBC # BLD: 0 /100 WBCS — SIGNIFICANT CHANGE UP (ref 0–0)
NRBC # FLD: 0 K/UL — SIGNIFICANT CHANGE UP (ref 0–0)
PLATELET # BLD AUTO: 190 K/UL — SIGNIFICANT CHANGE UP (ref 150–400)
POTASSIUM SERPL-MCNC: 4.4 MMOL/L — SIGNIFICANT CHANGE UP (ref 3.5–5.3)
POTASSIUM SERPL-SCNC: 4.4 MMOL/L — SIGNIFICANT CHANGE UP (ref 3.5–5.3)
RBC # BLD: 4.14 M/UL — SIGNIFICANT CHANGE UP (ref 3.8–5.2)
RBC # FLD: 14.2 % — SIGNIFICANT CHANGE UP (ref 10.3–14.5)
SODIUM SERPL-SCNC: 139 MMOL/L — SIGNIFICANT CHANGE UP (ref 135–145)
WBC # BLD: 10.39 K/UL — SIGNIFICANT CHANGE UP (ref 3.8–10.5)
WBC # FLD AUTO: 10.39 K/UL — SIGNIFICANT CHANGE UP (ref 3.8–10.5)

## 2024-02-08 RX ORDER — PANTOPRAZOLE SODIUM 20 MG/1
1 TABLET, DELAYED RELEASE ORAL
Qty: 30 | Refills: 0
Start: 2024-02-08 | End: 2024-03-08

## 2024-02-08 RX ORDER — ASPIRIN/CALCIUM CARB/MAGNESIUM 324 MG
1 TABLET ORAL
Refills: 0 | DISCHARGE

## 2024-02-08 RX ORDER — POLYETHYLENE GLYCOL 3350 17 G/17G
17 POWDER, FOR SOLUTION ORAL
Qty: 0 | Refills: 0 | DISCHARGE
Start: 2024-02-08

## 2024-02-08 RX ORDER — ACETAMINOPHEN 500 MG
3 TABLET ORAL
Qty: 0 | Refills: 0 | DISCHARGE
Start: 2024-02-08

## 2024-02-08 RX ORDER — DICLOFENAC SODIUM 75 MG/1
1 TABLET, DELAYED RELEASE ORAL
Refills: 0 | DISCHARGE

## 2024-02-08 RX ORDER — LISINOPRIL 2.5 MG/1
1 TABLET ORAL
Qty: 0 | Refills: 0 | DISCHARGE
Start: 2024-02-08

## 2024-02-08 RX ORDER — TRAMADOL HYDROCHLORIDE 50 MG/1
1 TABLET ORAL
Qty: 28 | Refills: 0
Start: 2024-02-08 | End: 2024-02-14

## 2024-02-08 RX ORDER — SENNA PLUS 8.6 MG/1
2 TABLET ORAL
Qty: 0 | Refills: 0 | DISCHARGE
Start: 2024-02-08

## 2024-02-08 RX ORDER — NALOXONE HYDROCHLORIDE 4 MG/.1ML
4 SPRAY NASAL
Qty: 1 | Refills: 0
Start: 2024-02-08

## 2024-02-08 RX ORDER — ASPIRIN/CALCIUM CARB/MAGNESIUM 324 MG
1 TABLET ORAL
Qty: 84 | Refills: 0
Start: 2024-02-08 | End: 2024-03-20

## 2024-02-08 RX ORDER — OXYCODONE HYDROCHLORIDE 5 MG/1
1 TABLET ORAL
Qty: 28 | Refills: 0
Start: 2024-02-08 | End: 2024-02-14

## 2024-02-08 RX ADMIN — Medication 975 MILLIGRAM(S): at 12:39

## 2024-02-08 RX ADMIN — Medication 1 DROP(S): at 05:49

## 2024-02-08 RX ADMIN — OXYCODONE HYDROCHLORIDE 10 MILLIGRAM(S): 5 TABLET ORAL at 11:47

## 2024-02-08 RX ADMIN — Medication 101.6 MILLIGRAM(S): at 05:48

## 2024-02-08 RX ADMIN — OXYCODONE HYDROCHLORIDE 10 MILLIGRAM(S): 5 TABLET ORAL at 10:47

## 2024-02-08 RX ADMIN — SODIUM CHLORIDE 500 MILLILITER(S): 9 INJECTION, SOLUTION INTRAVENOUS at 05:50

## 2024-02-08 RX ADMIN — Medication 325 MILLIGRAM(S): at 05:49

## 2024-02-08 RX ADMIN — Medication 100 MILLIGRAM(S): at 00:37

## 2024-02-08 RX ADMIN — Medication 1 DROP(S): at 11:39

## 2024-02-08 RX ADMIN — OXYCODONE HYDROCHLORIDE 10 MILLIGRAM(S): 5 TABLET ORAL at 17:05

## 2024-02-08 RX ADMIN — Medication 15 MILLIGRAM(S): at 15:09

## 2024-02-08 RX ADMIN — Medication 15 MILLIGRAM(S): at 00:00

## 2024-02-08 RX ADMIN — PANTOPRAZOLE SODIUM 40 MILLIGRAM(S): 20 TABLET, DELAYED RELEASE ORAL at 05:49

## 2024-02-08 RX ADMIN — Medication 400 MILLIGRAM(S): at 04:00

## 2024-02-08 RX ADMIN — Medication 15 MILLIGRAM(S): at 08:25

## 2024-02-08 RX ADMIN — Medication 15 MILLIGRAM(S): at 16:10

## 2024-02-08 RX ADMIN — Medication 1000 MILLIGRAM(S): at 04:30

## 2024-02-08 RX ADMIN — OXYCODONE HYDROCHLORIDE 10 MILLIGRAM(S): 5 TABLET ORAL at 16:14

## 2024-02-08 RX ADMIN — POLYETHYLENE GLYCOL 3350 17 GRAM(S): 17 POWDER, FOR SOLUTION ORAL at 13:38

## 2024-02-08 RX ADMIN — Medication 1 DROP(S): at 05:50

## 2024-02-08 RX ADMIN — Medication 975 MILLIGRAM(S): at 11:39

## 2024-02-08 NOTE — PROGRESS NOTE ADULT - ASSESSMENT
A/P: 73y y/o Female s/p R total knee arthroplasty, POD #0  - Pain control  - Antibiotic - Ancef postop  - Incentive Spirometry  - DVT prophylaxis: Venodynes/Aspirin 325mg BID  - F/U AM Labs  - PT/OT/WBAT  - Recommended for home with home PT    For all questions related to patient care, please reach out to the on-call team via the pager.     Edna Desouza, PGY 2  Orthopaedic Surgery  [facility] [id number]  [facility] [id number]  [facility] [id number]  
A/P: 73y y/o Female s/p R total knee arthroplasty, POD #0  - Pain control  - Antibiotic - Ancef postop  - Incentive Spirometry  - DVT prophylaxis: Venodynes/Aspirin 325mg BID  - F/U AM Labs  - PT/OT/WBAT  - Notify Orthopedics with any questions

## 2024-02-08 NOTE — DISCHARGE NOTE NURSING/CASE MANAGEMENT/SOCIAL WORK - NSDCPNINST_GEN_ALL_CORE
Notify Dr Blue if you experience any increase in pain not relieved with medication, any redness, drainage or swelling around incision or any fever >100.5.  Drink plenty of fluids, no heavy lifting or straining.  Continue to elevate your leg, apply cold therapy, and do exercises as instructed.  Use over the counter stool softeners to assist with constipation which can be a side effect of narcotic pain medication.

## 2024-02-08 NOTE — PROGRESS NOTE ADULT - ATTENDING COMMENTS
Patient seen and examined. Jose Sharif is a 73 year old female now status post Right Total Knee Arthroplasty. No acute events overnight. Pain is currently well controlled.    Physical Exam:  Dressing: Clean, Dry, Intact  Motor: Intact EHL/FHL/Tibialis Anterior/Gastrocnemius  Sensory: Intact Superficial Peroneal/Deep Peroneal/Saphenous/Sural/Tibial Nerves  Vascular: 2+ DP Pulse    Assessment/Plan:  Jose Sharif is a 73 year old female now status post Right Total Knee Arthroplasty.    Plan:  -Right Lower Extremity: Weight Bearing as Tolerated  -PT/OT, Out of Bed  -DVT Prophylaxis: Aspiring 325mg twice per day  -Antibiotics: Ancef 2g x24 hours  -Pain Control  -Disposition: Home

## 2024-02-08 NOTE — PROGRESS NOTE ADULT - SUBJECTIVE AND OBJECTIVE BOX
ANESTHESIA POSTOP CHECK    73y Female POSTOP DAY 1 S/P R TKA    Vital Signs Last 24 Hrs  T(C): 36.9 (08 Feb 2024 09:45), Max: 36.9 (08 Feb 2024 05:45)  T(F): 98.5 (08 Feb 2024 09:45), Max: 98.5 (08 Feb 2024 05:45)  HR: 77 (08 Feb 2024 09:45) (65 - 90)  BP: 133/65 (08 Feb 2024 09:45) (111/60 - 133/65)  BP(mean): 86 (07 Feb 2024 14:30) (65 - 86)  RR: 18 (08 Feb 2024 09:45) (10 - 18)  SpO2: 100% (08 Feb 2024 09:45) (94% - 100%)    Parameters below as of 08 Feb 2024 09:45  Patient On (Oxygen Delivery Method): room air      I&O's Summary    07 Feb 2024 07:01  -  08 Feb 2024 07:00  --------------------------------------------------------  IN: 710 mL / OUT: 600 mL / NET: 110 mL        [x] NO APPARENT ANESTHESIA COMPLICATIONS      
Orthopedics Post-Op Check:  Patient was seen and examined at bedside. Denies CP/SOB/Dizziness/N/V/D/HA. Pain is well controlled at the moment.    Vital Signs Last 24 Hrs  T(C): 36.5 (07 Feb 2024 12:15), Max: 36.6 (07 Feb 2024 06:20)  T(F): 97.7 (07 Feb 2024 12:15), Max: 97.9 (07 Feb 2024 06:20)  HR: 70 (07 Feb 2024 12:15) (66 - 73)  BP: 132/71 (07 Feb 2024 12:15) (111/60 - 153/80)  BP(mean): 84 (07 Feb 2024 12:15) (65 - 84)  RR: 18 (07 Feb 2024 12:15) (10 - 18)  SpO2: 97% (07 Feb 2024 12:15) (94% - 100%)    Parameters below as of 07 Feb 2024 12:15  Patient On (Oxygen Delivery Method): room air    Labs: FU AM     Physical Exam:  Gen: NAD  RLE:   Dressing C/D/I  Motor intact + EHL/FHL/TA/GS. Sensation is grossly intact.   Compartments are soft, extremities are warm, DP 2+          
  ORTHOPEDIC PROGRESS NOTE    Overnight events: None    SUBJECTIVE: Pt seen and examined at bedside. Patient is doing well, no acute complaints this AM. Pain is controlled with medication      OBJECTIVE:  Vital Signs Last 24 Hrs  T(C): 36.4 (08 Feb 2024 01:46), Max: 36.6 (07 Feb 2024 06:20)  T(F): 97.6 (08 Feb 2024 01:46), Max: 97.9 (07 Feb 2024 06:20)  HR: 87 (08 Feb 2024 01:46) (65 - 90)  BP: 132/76 (08 Feb 2024 01:46) (111/60 - 153/80)  BP(mean): 86 (07 Feb 2024 14:30) (65 - 86)  RR: 18 (08 Feb 2024 01:46) (10 - 18)  SpO2: 100% (08 Feb 2024 01:46) (94% - 100%)    Parameters below as of 08 Feb 2024 01:46  Patient On (Oxygen Delivery Method): room air          02-07-24 @ 07:01  -  02-08-24 @ 04:16  --------------------------------------------------------  IN: 710 mL / OUT: 600 mL / NET: 110 mL        Physical Examination:  GEN: NAD, resting quietly  PULM: symmetric chest rise bilaterally, no increased WOB  ABD: nondistended  EXTR:   Dressing clean dry intact  5/5 EHL/FHL/TA/GS  SILT L3-S1  +DP/PT Pulses  Compartments soft  No calf TTP B/L      LABS:

## 2024-02-13 RX ORDER — CIPROFLOXACIN HCL 0.3 %
2 DROPS OPHTHALMIC (EYE)
Refills: 0 | DISCHARGE

## 2024-02-13 NOTE — CHART NOTE - NSCHARTNOTEFT_GEN_A_CORE
Post-Discharge Medication Review  Patient's preferred pharmacy was updated in [facility]: [facility]   Caregiver (Gregoria, Daughter) contacted to offer medication counseling post-discharge. Medication reconciliation completed. Per Caregiver, medications include:    1.	aspirin 325 mg oral tablet 1 tab(s) orally 2 times a day  2.	atorvastatin 10 mg oral tablet 1 tab(s) orally  3.	cholecalciferol 1250 mcg (50,000 intl units) oral capsule 1 cap(s) orally once a week  4.	ketorolac 0.5% ophthalmic solution 1 drop(s) in each affected eye 2 times a day left eye  5.	lisinopril-hydrochlorothiazide 10 mg-12.5 mg oral tablet 1 tab(s) orally once a day in PM  6.	naloxone 4 mg/0.1 mL nasal spray 4 milligram(s) intranasally once as needed for opioid reversal  7.	naproxen 500 mg oral delayed release tablet 1 tab(s) orally 2 times a day  8.	oxyCODONE 5 mg oral tablet 1 tab(s) orally every 6 hours as needed for Severe Pain (7 - 10) MDD: 4  9.	pantoprazole 40 mg oral delayed release tablet 1 tab(s) orally once a day (before a meal)  10.	polyethylene glycol 3350 oral powder for reconstitution 17 gram(s) orally once a day (at bedtime)  11.	prednisoLONE acetate 1% ophthalmic suspension 1 drop(s) in each affected eye 4 times a day left eye  12.	senna leaf extract oral tablet 2 tab(s) orally once a day (at bedtime)  13.	traMADol 50 mg oral tablet 1 tab(s) orally every 6 hours as needed for Mild Pain (1 - 3) MDD: 4  14.	Dorzolamide 2% and timolol 0.5% ophthalmic  1 drop into the right eye twice daily : (Added to ORM)  15.	ciprofloxacin 0.3% solution 1 drop into the left eye four times a day: (Added to ORM)  16.	bisacodyl 5mg 1 tablet daily: (Added to ORM)    During post discharge counseling, patient’s daughter confirmed that patient has not been using acetaminophen 325 mg (Removed from ORM). Patient uses lisinopril/ HCTZ combination, not lisinopril (Removed).    Medication name, indication, administration, side effects and monitoring reviewed for new medications, post-discharge with Caregiver. Caregiver demonstrated understanding. Counseling offered for all medications.

## 2024-02-22 ENCOUNTER — APPOINTMENT (OUTPATIENT)
Dept: ORTHOPEDIC SURGERY | Facility: CLINIC | Age: 74
End: 2024-02-22
Payer: MEDICARE

## 2024-02-22 PROCEDURE — 73562 X-RAY EXAM OF KNEE 3: CPT | Mod: RT

## 2024-02-22 PROCEDURE — 99024 POSTOP FOLLOW-UP VISIT: CPT

## 2024-02-22 RX ORDER — OXYCODONE 5 MG/1
5 TABLET ORAL EVERY 6 HOURS
Qty: 8 | Refills: 0 | Status: ACTIVE | COMMUNITY
Start: 2024-02-22 | End: 1900-01-01

## 2024-02-25 NOTE — DISCUSSION/SUMMARY
[de-identified] : Jose Sharif 73-year-old male who presented to the office for follow-up of her right total knee arthroplasty.  Patient underwent right TKA on 2/7/2024. XRays showed right total knee arthroplasty in good position and alignment. Examination showed range of motion 0 to 105. Discussed with the patient the examination and imaging findings. Discussed the management of total knee arthroplasty at this time, including physical therapy and DVT prophylaxis. Patient was given a referral to physical therapy. Patient will continued to take Aspirin twice daily for a total of 6 weeks for DVT prophylaxis. Dressing was removed. Discussed that patient may shower, but should not soak the wound. Patient will follow up in 4 weeks for reevaluation and management. Patient understanding and in agreement with the plan. All questions answered.   Plan: -Physical Therapy -Oxycodone -DVT Prophylaxis: Aspirin twice daily for a total of 6 weeks -Patient may shower, but should not soak the wound -Follow up in 4 weeks for reevaluation and management

## 2024-02-25 NOTE — HISTORY OF PRESENT ILLNESS
[de-identified] : 2/22/2024  Jose Sharif presented to the office for follow-up of her right total knee arthroplasty.  Patient underwent right TKA on 2/7/2024.  She is currently doing well overall.  Patient has been having some knee pain.  Pain is located over the posterior knee.  She is currently in home physical therapy.  Patient been taking her aspirin for her DVT prophylaxis.  No falls.  No fevers or chills.  1/4/2024 Jose Sharif is a 73-year-old female who presented, with her daughter, for evaluation of her bilateral knee pain.  Patient has been experiencing bilateral knee pain over the last few years.  Right knee is worse than the left.  Patient had increased walking few days ago and then experienced worsening pain and swelling.  She also had back pain.  Patient has difficulty with sitting, prior, walking, bending the knee, and with stairs.   Pain is located over the medial and lateral knee. Patient has tried diclofenac gel, with some relief.  She has tried Tylenol, with some relief. She has previously tried physical therapy, last in 2020 1/2022.  She has also tried corticosteroid injections, last in 2021, which only worked for 1 to 2 days.  No groin pain. She also reports bilateral foot numbness. Patient is Romanian and English speaking.  History: Bilateral Pes Planus, CKD, HLD, Palpitations

## 2024-03-04 RX ORDER — BISACODYL 5 MG/1
5 TABLET, DELAYED RELEASE ORAL
Qty: 7 | Refills: 0 | Status: ACTIVE | COMMUNITY
Start: 2024-02-22 | End: 1900-01-01

## 2024-03-21 ENCOUNTER — APPOINTMENT (OUTPATIENT)
Dept: ORTHOPEDIC SURGERY | Facility: CLINIC | Age: 74
End: 2024-03-21
Payer: MEDICARE

## 2024-03-21 PROCEDURE — 99024 POSTOP FOLLOW-UP VISIT: CPT

## 2024-03-21 PROCEDURE — 73562 X-RAY EXAM OF KNEE 3: CPT | Mod: RT

## 2024-03-26 RX ORDER — LISINOPRIL/HYDROCHLOROTHIAZIDE 10-12.5 MG
1 TABLET ORAL
Refills: 0 | DISCHARGE

## 2024-03-26 RX ORDER — ATORVASTATIN CALCIUM 80 MG/1
1 TABLET, FILM COATED ORAL
Refills: 0 | DISCHARGE

## 2024-03-26 RX ORDER — PREDNISOLONE SODIUM PHOSPHATE 1 %
1 DROPS OPHTHALMIC (EYE)
Refills: 0 | DISCHARGE

## 2024-03-26 RX ORDER — KETOROLAC TROMETHAMINE 0.5 %
1 DROPS OPHTHALMIC (EYE)
Refills: 0 | DISCHARGE

## 2024-03-26 RX ORDER — DORZOLAMIDE HYDROCHLORIDE TIMOLOL MALEATE 20; 5 MG/ML; MG/ML
1 SOLUTION/ DROPS OPHTHALMIC
Refills: 0 | DISCHARGE

## 2024-03-26 RX ORDER — CHOLECALCIFEROL (VITAMIN D3) 125 MCG
1 CAPSULE ORAL
Refills: 0 | DISCHARGE

## 2024-03-26 RX ORDER — CIPROFLOXACIN HCL 0.3 %
1 DROPS OPHTHALMIC (EYE)
Refills: 0 | DISCHARGE

## 2024-03-27 NOTE — DISCUSSION/SUMMARY
[de-identified] : Jose Sharif 73-year-old male who presented to the office for follow-up of her right total knee arthroplasty.  Patient underwent right TKA on 2/7/2024. XRays showed right total knee arthroplasty in good position and alignment. Examination showed range of motion 0 to 115. Discussed with the patient the examination and imaging findings. Discussed the management of total knee arthroplasty at this time, including physical therapy. Patient will continue physical therapy. Patient has completed DVT prophylaxis. Patient will follow up in 6 weeks for reevaluation and management. Patient understanding and in agreement with the plan. All questions answered.     Plan: -Physical Therapy -DVT Prophylaxis: Completed -Follow up in 6 weeks for reevaluation and management

## 2024-03-27 NOTE — HISTORY OF PRESENT ILLNESS
[de-identified] : 3/21/2024  Jose Sharif presents to the office for follow-up of her right total knee arthroplasty.  Patient underwent right TKA on 2/7/2024.  She has been having right knee pain for the last few days and also some ankle/hip pain.  Pain is located over the anterior knee.  She has tried Tylenol.  Patient stopped physical therapy last week.  She had pain after PT and stretching.  No falls.  No fevers or chills.  2/22/2024  Jose Sharif presented to the office for follow-up of her right total knee arthroplasty.  Patient underwent right TKA on 2/7/2024.  She is currently doing well overall.  Patient has been having some knee pain.  Pain is located over the posterior knee.  She is currently in home physical therapy.  Patient been taking her aspirin for her DVT prophylaxis.  No falls.  No fevers or chills.  1/4/2024 Jose Sharif is a 73-year-old female who presented, with her daughter, for evaluation of her bilateral knee pain.  Patient has been experiencing bilateral knee pain over the last few years.  Right knee is worse than the left.  Patient had increased walking few days ago and then experienced worsening pain and swelling.  She also had back pain.  Patient has difficulty with sitting, prior, walking, bending the knee, and with stairs.   Pain is located over the medial and lateral knee. Patient has tried diclofenac gel, with some relief.  She has tried Tylenol, with some relief. She has previously tried physical therapy, last in 2020 1/2022.  She has also tried corticosteroid injections, last in 2021, which only worked for 1 to 2 days.  No groin pain. She also reports bilateral foot numbness. Patient is Italian and English speaking.  History: Bilateral Pes Planus, CKD, HLD, Palpitations

## 2024-03-27 NOTE — PHYSICAL EXAM
[de-identified] : Constitutional:  74 year old female, alert and oriented, cooperative, in no acute distress.  HEENT  NC/AT.  Appearance: symmetric  Neck/Back Straight without deformity or instability.  Good ROM.  Chest/Respiratory  Respiratory effort: no intercostal retractions or use of accessory muscles. Nonlabored Breathing  Skin  On inspection, warm and dry without rashes or lesions.  Mental Status:  Judgment, insight: intact Orientation: oriented to time, place, and person  Neurological: Sensory and Motor are grossly intact throughout  Right Knee  Inspection:     Incision well healed. No erythema or drainage     No Effusion     Non-tender to palpation over tibial tubercle, patella, medial and lateral joint line,. Tenderness over the pes insertion.  Range of Motion: 	Extension - 0 degrees 	Flexion - 115 degrees 	Alignment - Valgus 3 degrees 	Extensor lag: None  Stability:      Demonstrates no Varus or Valgus instability      Negative Anterior or Posterior drawer.      No mid flexion instability  Patella: stable, tracks well.   Neurologic Exam     Motor intact including 5/5 Extensor Hallucis Longus, 5/5 Flexor Hallucis Longus, 5/5 Tibialis Anterior and 5/5 Gastrocnemius     Sensation Intact to Light Touch including Saphenous, Sural, Superficial Peroneal, Deep Peroneal, Tibial nerve distributions  Vascular Exam     Foot is warm and well perfused with 2+ Dorsalis Pedis Pulse   No pain with range of motion of the bilateral hips or left knee. No lumbar paraspinal muscle tenderness. [de-identified] : XRay:  XRays of the Right Knee (3 Views) taken in the office today and reviewed with the patient. XRays demonstrate a Right Total Knee Arthroplasty in good position and alignment. There is no obvious evidence of fracture, dislocation, osteolysis or loosening. (my personal interpretation) Components: Yvon Tri[employer]lon CS Cemented

## 2024-03-27 NOTE — REVIEW OF SYSTEMS
[Joint Pain] : joint pain [Negative] : Neurological [Joint Stiffness] : no joint stiffness [Joint Swelling] : no joint swelling [Fever] : no fever [Chills] : no chills

## 2024-04-19 ENCOUNTER — NON-APPOINTMENT (OUTPATIENT)
Age: 74
End: 2024-04-19

## 2024-05-02 ENCOUNTER — APPOINTMENT (OUTPATIENT)
Dept: ORTHOPEDIC SURGERY | Facility: CLINIC | Age: 74
End: 2024-05-02

## 2024-05-14 ENCOUNTER — APPOINTMENT (OUTPATIENT)
Dept: ORTHOPEDIC SURGERY | Facility: CLINIC | Age: 74
End: 2024-05-14
Payer: MEDICARE

## 2024-05-14 DIAGNOSIS — Z96.659 PRESENCE OF UNSPECIFIED ARTIFICIAL KNEE JOINT: ICD-10-CM

## 2024-05-14 PROCEDURE — 73562 X-RAY EXAM OF KNEE 3: CPT | Mod: RT

## 2024-05-14 PROCEDURE — 99213 OFFICE O/P EST LOW 20 MIN: CPT | Mod: 25

## 2024-05-25 PROBLEM — Z96.659 S/P TOTAL KNEE ARTHROPLASTY: Status: ACTIVE | Noted: 2024-02-13

## 2024-05-25 NOTE — PHYSICAL EXAM
[de-identified] : Constitutional:  74 year old female, alert and oriented, cooperative, in no acute distress.  HEENT  NC/AT.  Appearance: symmetric  Neck/Back Straight without deformity or instability.  Good ROM.  Chest/Respiratory  Respiratory effort: no intercostal retractions or use of accessory muscles. Nonlabored Breathing  Skin  On inspection, warm and dry without rashes or lesions.  Mental Status:  Judgment, insight: intact Orientation: oriented to time, place, and person  Neurological: Sensory and Motor are grossly intact throughout  Right Knee  Inspection:     Incision well healed. No erythema or drainage     No Effusion     Non-tender to palpation over tibial tubercle, patella, medial joint line,. Tenderness over the pes insertion.    Tenderness over the lateral joint line  Range of Motion: 	Extension - 0 degrees 	Flexion - 120 degrees 	Alignment - Valgus 3 degrees 	Extensor lag: None  Stability:      Demonstrates no Varus or Valgus instability      Negative Anterior or Posterior drawer.      No mid flexion instability  Patella: stable, tracks well.   Neurologic Exam     Motor intact including 5/5 Extensor Hallucis Longus, 5/5 Flexor Hallucis Longus, 5/5 Tibialis Anterior and 5/5 Gastrocnemius     Sensation Intact to Light Touch including Saphenous, Sural, Superficial Peroneal, Deep Peroneal, Tibial nerve distributions  Vascular Exam     Foot is warm and well perfused with 2+ Dorsalis Pedis Pulse   No pain with range of motion of the bilateral hips or left knee. No lumbar paraspinal muscle tenderness. [de-identified] : XRay:  XRays of the Right Knee (3 Views) taken in the office today and reviewed with the patient. XRays demonstrate a Right Total Knee Arthroplasty in good position and alignment. There is no obvious evidence of fracture, dislocation, osteolysis or loosening. (my personal interpretation) Components: Yvon Tri[employer]lon CS Cemented

## 2024-05-25 NOTE — HISTORY OF PRESENT ILLNESS
[de-identified] : 5/14/2024  Jose Sharif presents to the office for follow-up of her right total knee arthroplasty.  Patient underwent right TKA on 2/7/2024.  She continues to have some knee pain and pain in the ankle/posterior leg.  She also has pain over the lateral knee.  Patient has ankle pain and posterior leg pain that radiates to the ankle.  No falls.  No fevers or chills.  She is currently in physical therapy.  3/21/2024  Jose Sharif presents to the office for follow-up of her right total knee arthroplasty.  Patient underwent right TKA on 2/7/2024.  She has been having right knee pain for the last few days and also some ankle/hip pain.  Pain is located over the anterior knee.  She has tried Tylenol.  Patient stopped physical therapy last week.  She had pain after PT and stretching.  No falls.  No fevers or chills.  2/22/2024  Jose Sharif presented to the office for follow-up of her right total knee arthroplasty.  Patient underwent right TKA on 2/7/2024.  She is currently doing well overall.  Patient has been having some knee pain.  Pain is located over the posterior knee.  She is currently in home physical therapy.  Patient been taking her aspirin for her DVT prophylaxis.  No falls.  No fevers or chills.  1/4/2024 Jose Sharif is a 73-year-old female who presented, with her daughter, for evaluation of her bilateral knee pain.  Patient has been experiencing bilateral knee pain over the last few years.  Right knee is worse than the left.  Patient had increased walking few days ago and then experienced worsening pain and swelling.  She also had back pain.  Patient has difficulty with sitting, prior, walking, bending the knee, and with stairs.   Pain is located over the medial and lateral knee. Patient has tried diclofenac gel, with some relief.  She has tried Tylenol, with some relief. She has previously tried physical therapy, last in 2020 1/2022.  She has also tried corticosteroid injections, last in 2021, which only worked for 1 to 2 days.  No groin pain. She also reports bilateral foot numbness. Patient is Romansh and English speaking.  History: Bilateral Pes Planus, CKD, HLD, Palpitations

## 2024-05-25 NOTE — DISCUSSION/SUMMARY
[de-identified] : Jose Sharif 73-year-old male who presented to the office for follow-up of her right total knee arthroplasty.  Patient underwent right TKA on 2/7/2024. XRays showed right total knee arthroplasty in good position and alignment. Examination showed range of motion 0 to 120. Discussed with the patient the examination and imaging findings. Discussed the management of total knee arthroplasty at this time, including physical therapy. Patient will continue physical therapy. Patient has completed DVT prophylaxis. Discussed patient's radiating leg pain and ankle pain. Discussed that would be beneficial to undergo further evaluation by a foot and ankle specialist and a spine specialist.  Patient was given referral to Dr. Roy and Dr. Quinteros. Patient will follow up in 6 weeks for reevaluation and management. Patient understanding and in agreement with the plan. All questions answered.     Plan: -Physical Therapy -Follow up with Dr. Roy and Dr. Quinteros -Follow up in 6 weeks for reevaluation and management

## 2024-07-09 ENCOUNTER — APPOINTMENT (OUTPATIENT)
Dept: ORTHOPEDIC SURGERY | Facility: CLINIC | Age: 74
End: 2024-07-09
Payer: MEDICARE

## 2024-07-09 DIAGNOSIS — Z96.659 PRESENCE OF UNSPECIFIED ARTIFICIAL KNEE JOINT: ICD-10-CM

## 2024-07-09 PROCEDURE — 73562 X-RAY EXAM OF KNEE 3: CPT | Mod: RT

## 2024-07-09 PROCEDURE — 99213 OFFICE O/P EST LOW 20 MIN: CPT | Mod: 25

## 2024-07-09 RX ORDER — DICLOFENAC SODIUM 1% 10 MG/G
1 GEL TOPICAL DAILY
Qty: 2 | Refills: 0 | Status: ACTIVE | COMMUNITY
Start: 2024-07-09 | End: 1900-01-01

## 2024-07-09 RX ORDER — CELECOXIB 200 MG/1
200 CAPSULE ORAL DAILY
Qty: 14 | Refills: 1 | Status: ACTIVE | COMMUNITY
Start: 2024-07-09 | End: 1900-01-01

## 2024-07-10 LAB
CRP SERPL-MCNC: <3 MG/L
ERYTHROCYTE [SEDIMENTATION RATE] IN BLOOD BY WESTERGREN METHOD: 12 MM/HR

## 2024-10-03 ENCOUNTER — APPOINTMENT (OUTPATIENT)
Dept: ORTHOPEDIC SURGERY | Facility: CLINIC | Age: 74
End: 2024-10-03
Payer: MEDICARE

## 2024-10-03 DIAGNOSIS — Z96.659 PRESENCE OF UNSPECIFIED ARTIFICIAL KNEE JOINT: ICD-10-CM

## 2024-10-03 PROCEDURE — 99213 OFFICE O/P EST LOW 20 MIN: CPT

## 2024-10-04 NOTE — HISTORY OF PRESENT ILLNESS
[de-identified] : 10/3/2024  Jose Sharif presents to the office for follow-up of her right TKA.  Patient underwent surgery on 2/7/2024.  Patient's right knee pain has been improving, but she can have pain.  She has been experiencing left knee, back, and shoulder pain as well.  No falls.  No fevers or chills.  7/9/2024  Jose Sharif presented to the office for follow of her right TKA. Patient underwent Right TKA on 2/7/2024. She has been experiencing bilateral knee pain. There has been right knee pain for about 2 months. No falls. No fevers or chills. She has been in PT.  5/14/2024  Jose Sharif presents to the office for follow-up of her right total knee arthroplasty.  Patient underwent right TKA on 2/7/2024.  She continues to have some knee pain and pain in the ankle/posterior leg.  She also has pain over the lateral knee.  Patient has ankle pain and posterior leg pain that radiates to the ankle.  No falls.  No fevers or chills.  She is currently in physical therapy.  3/21/2024  Jose Sharif presents to the office for follow-up of her right total knee arthroplasty.  Patient underwent right TKA on 2/7/2024.  She has been having right knee pain for the last few days and also some ankle/hip pain.  Pain is located over the anterior knee.  She has tried Tylenol.  Patient stopped physical therapy last week.  She had pain after PT and stretching.  No falls.  No fevers or chills.  2/22/2024  Jose Sharif presented to the office for follow-up of her right total knee arthroplasty.  Patient underwent right TKA on 2/7/2024.  She is currently doing well overall.  Patient has been having some knee pain.  Pain is located over the posterior knee.  She is currently in home physical therapy.  Patient been taking her aspirin for her DVT prophylaxis.  No falls.  No fevers or chills.  1/4/2024 Jose Sharif is a 73-year-old female who presented, with her daughter, for evaluation of her bilateral knee pain.  Patient has been experiencing bilateral knee pain over the last few years.  Right knee is worse than the left.  Patient had increased walking few days ago and then experienced worsening pain and swelling.  She also had back pain.  Patient has difficulty with sitting, prior, walking, bending the knee, and with stairs.   Pain is located over the medial and lateral knee. Patient has tried diclofenac gel, with some relief.  She has tried Tylenol, with some relief. She has previously tried physical therapy, last in 2020 1/2022.  She has also tried corticosteroid injections, last in 2021, which only worked for 1 to 2 days.  No groin pain. She also reports bilateral foot numbness. Patient is Sinhala and English speaking.  History: Bilateral Pes Planus, CKD, HLD, Palpitations

## 2024-10-04 NOTE — DISCUSSION/SUMMARY
[de-identified] : Jose Sharif 74-year-old male who presented to the office for follow-up of her right total knee arthroplasty.  Patient underwent right TKA on 2/7/2024. Prior XRays showed right total knee arthroplasty in good position and alignment. Examination showed range of motion 0 to 120. Discussed with the patient the examination and imaging findings. Discussed the management of total knee arthroplasty at this time, including home exercises. Patient will continue home exercises. Patient will participate in activities as tolerated. Patient will follow up in 6 months for reevaluation and management. Patient understanding and in agreement with the plan. All questions answered.     Plan: -Home Exercises -Activities as Tolerated -Follow up in 6 months for reevaluation and management

## 2024-10-04 NOTE — PHYSICAL EXAM
[de-identified] : Constitutional:  74 year old female, alert and oriented, cooperative, in no acute distress.  HEENT  NC/AT.  Appearance: symmetric  Neck/Back Straight without deformity or instability.  Good ROM.  Chest/Respiratory  Respiratory effort: no intercostal retractions or use of accessory muscles. Nonlabored Breathing  Skin  On inspection, warm and dry without rashes or lesions.  Mental Status:  Judgment, insight: intact Orientation: oriented to time, place, and person  Neurological: Sensory and Motor are grossly intact throughout  Right Knee  Inspection:     Incision well healed. No erythema or drainage     No Effusion     Non-tender to palpation over tibial tubercle, patella        Tenderness over the lateral joint line and medial joint line  Range of Motion: 	Extension - 0 degrees 	Flexion - 120 degrees 	Alignment - Valgus 3 degrees 	Extensor lag: None  Stability:      Demonstrates no Varus or Valgus instability      Negative Anterior or Posterior drawer.      No mid flexion instability  Patella: stable, tracks well.   Neurologic Exam     Motor intact including 5/5 Extensor Hallucis Longus, 5/5 Flexor Hallucis Longus, 5/5 Tibialis Anterior and 5/5 Gastrocnemius     Sensation Intact to Light Touch including Saphenous, Sural, Superficial Peroneal, Deep Peroneal, Tibial nerve distributions  Vascular Exam     Foot is warm and well perfused with 2+ Dorsalis Pedis Pulse   No pain with range of motion of the bilateral hips or left knee. No lumbar paraspinal muscle tenderness. [de-identified] : XRay:  XRays of the Right Knee (3 Views) taken on 7/9/2024. XRays demonstrate a Right Total Knee Arthroplasty in good position and alignment. There is no obvious evidence of fracture, dislocation, osteolysis or loosening. (my personal interpretation) Components: [employer]

## 2024-10-04 NOTE — PHYSICAL EXAM
[de-identified] : Constitutional:  74 year old female, alert and oriented, cooperative, in no acute distress.  HEENT  NC/AT.  Appearance: symmetric  Neck/Back Straight without deformity or instability.  Good ROM.  Chest/Respiratory  Respiratory effort: no intercostal retractions or use of accessory muscles. Nonlabored Breathing  Skin  On inspection, warm and dry without rashes or lesions.  Mental Status:  Judgment, insight: intact Orientation: oriented to time, place, and person  Neurological: Sensory and Motor are grossly intact throughout  Right Knee  Inspection:     Incision well healed. No erythema or drainage     No Effusion     Non-tender to palpation over tibial tubercle, patella        Tenderness over the lateral joint line and medial joint line  Range of Motion: 	Extension - 0 degrees 	Flexion - 120 degrees 	Alignment - Valgus 3 degrees 	Extensor lag: None  Stability:      Demonstrates no Varus or Valgus instability      Negative Anterior or Posterior drawer.      No mid flexion instability  Patella: stable, tracks well.   Neurologic Exam     Motor intact including 5/5 Extensor Hallucis Longus, 5/5 Flexor Hallucis Longus, 5/5 Tibialis Anterior and 5/5 Gastrocnemius     Sensation Intact to Light Touch including Saphenous, Sural, Superficial Peroneal, Deep Peroneal, Tibial nerve distributions  Vascular Exam     Foot is warm and well perfused with 2+ Dorsalis Pedis Pulse   No pain with range of motion of the bilateral hips or left knee. No lumbar paraspinal muscle tenderness. [de-identified] : XRay:  XRays of the Right Knee (3 Views) taken on 7/9/2024. XRays demonstrate a Right Total Knee Arthroplasty in good position and alignment. There is no obvious evidence of fracture, dislocation, osteolysis or loosening. (my personal interpretation) Components: [employer]

## 2024-10-04 NOTE — HISTORY OF PRESENT ILLNESS
[de-identified] : 10/3/2024  Jose Sharif presents to the office for follow-up of her right TKA.  Patient underwent surgery on 2/7/2024.  Patient's right knee pain has been improving, but she can have pain.  She has been experiencing left knee, back, and shoulder pain as well.  No falls.  No fevers or chills.  7/9/2024  Jose Sharif presented to the office for follow of her right TKA. Patient underwent Right TKA on 2/7/2024. She has been experiencing bilateral knee pain. There has been right knee pain for about 2 months. No falls. No fevers or chills. She has been in PT.  5/14/2024  Jose Sharif presents to the office for follow-up of her right total knee arthroplasty.  Patient underwent right TKA on 2/7/2024.  She continues to have some knee pain and pain in the ankle/posterior leg.  She also has pain over the lateral knee.  Patient has ankle pain and posterior leg pain that radiates to the ankle.  No falls.  No fevers or chills.  She is currently in physical therapy.  3/21/2024  Jose Sharif presents to the office for follow-up of her right total knee arthroplasty.  Patient underwent right TKA on 2/7/2024.  She has been having right knee pain for the last few days and also some ankle/hip pain.  Pain is located over the anterior knee.  She has tried Tylenol.  Patient stopped physical therapy last week.  She had pain after PT and stretching.  No falls.  No fevers or chills.  2/22/2024  Jose Sharif presented to the office for follow-up of her right total knee arthroplasty.  Patient underwent right TKA on 2/7/2024.  She is currently doing well overall.  Patient has been having some knee pain.  Pain is located over the posterior knee.  She is currently in home physical therapy.  Patient been taking her aspirin for her DVT prophylaxis.  No falls.  No fevers or chills.  1/4/2024 Jose Sharif is a 73-year-old female who presented, with her daughter, for evaluation of her bilateral knee pain.  Patient has been experiencing bilateral knee pain over the last few years.  Right knee is worse than the left.  Patient had increased walking few days ago and then experienced worsening pain and swelling.  She also had back pain.  Patient has difficulty with sitting, prior, walking, bending the knee, and with stairs.   Pain is located over the medial and lateral knee. Patient has tried diclofenac gel, with some relief.  She has tried Tylenol, with some relief. She has previously tried physical therapy, last in 2020 1/2022.  She has also tried corticosteroid injections, last in 2021, which only worked for 1 to 2 days.  No groin pain. She also reports bilateral foot numbness. Patient is Italian and English speaking.  History: Bilateral Pes Planus, CKD, HLD, Palpitations

## 2024-10-04 NOTE — PHYSICAL EXAM
[de-identified] : Constitutional:  74 year old female, alert and oriented, cooperative, in no acute distress.  HEENT  NC/AT.  Appearance: symmetric  Neck/Back Straight without deformity or instability.  Good ROM.  Chest/Respiratory  Respiratory effort: no intercostal retractions or use of accessory muscles. Nonlabored Breathing  Skin  On inspection, warm and dry without rashes or lesions.  Mental Status:  Judgment, insight: intact Orientation: oriented to time, place, and person  Neurological: Sensory and Motor are grossly intact throughout  Right Knee  Inspection:     Incision well healed. No erythema or drainage     No Effusion     Non-tender to palpation over tibial tubercle, patella        Tenderness over the lateral joint line and medial joint line  Range of Motion: 	Extension - 0 degrees 	Flexion - 120 degrees 	Alignment - Valgus 3 degrees 	Extensor lag: None  Stability:      Demonstrates no Varus or Valgus instability      Negative Anterior or Posterior drawer.      No mid flexion instability  Patella: stable, tracks well.   Neurologic Exam     Motor intact including 5/5 Extensor Hallucis Longus, 5/5 Flexor Hallucis Longus, 5/5 Tibialis Anterior and 5/5 Gastrocnemius     Sensation Intact to Light Touch including Saphenous, Sural, Superficial Peroneal, Deep Peroneal, Tibial nerve distributions  Vascular Exam     Foot is warm and well perfused with 2+ Dorsalis Pedis Pulse   No pain with range of motion of the bilateral hips or left knee. No lumbar paraspinal muscle tenderness. [de-identified] : XRay:  XRays of the Right Knee (3 Views) taken on 7/9/2024. XRays demonstrate a Right Total Knee Arthroplasty in good position and alignment. There is no obvious evidence of fracture, dislocation, osteolysis or loosening. (my personal interpretation) Components: [employer]

## 2024-10-04 NOTE — HISTORY OF PRESENT ILLNESS
[de-identified] : 10/3/2024  Jose Sharif presents to the office for follow-up of her right TKA.  Patient underwent surgery on 2/7/2024.  Patient's right knee pain has been improving, but she can have pain.  She has been experiencing left knee, back, and shoulder pain as well.  No falls.  No fevers or chills.  7/9/2024  Jose Sharif presented to the office for follow of her right TKA. Patient underwent Right TKA on 2/7/2024. She has been experiencing bilateral knee pain. There has been right knee pain for about 2 months. No falls. No fevers or chills. She has been in PT.  5/14/2024  Jose Sharif presents to the office for follow-up of her right total knee arthroplasty.  Patient underwent right TKA on 2/7/2024.  She continues to have some knee pain and pain in the ankle/posterior leg.  She also has pain over the lateral knee.  Patient has ankle pain and posterior leg pain that radiates to the ankle.  No falls.  No fevers or chills.  She is currently in physical therapy.  3/21/2024  Jose Sharif presents to the office for follow-up of her right total knee arthroplasty.  Patient underwent right TKA on 2/7/2024.  She has been having right knee pain for the last few days and also some ankle/hip pain.  Pain is located over the anterior knee.  She has tried Tylenol.  Patient stopped physical therapy last week.  She had pain after PT and stretching.  No falls.  No fevers or chills.  2/22/2024  Jose Sharif presented to the office for follow-up of her right total knee arthroplasty.  Patient underwent right TKA on 2/7/2024.  She is currently doing well overall.  Patient has been having some knee pain.  Pain is located over the posterior knee.  She is currently in home physical therapy.  Patient been taking her aspirin for her DVT prophylaxis.  No falls.  No fevers or chills.  1/4/2024 Jose Sharif is a 73-year-old female who presented, with her daughter, for evaluation of her bilateral knee pain.  Patient has been experiencing bilateral knee pain over the last few years.  Right knee is worse than the left.  Patient had increased walking few days ago and then experienced worsening pain and swelling.  She also had back pain.  Patient has difficulty with sitting, prior, walking, bending the knee, and with stairs.   Pain is located over the medial and lateral knee. Patient has tried diclofenac gel, with some relief.  She has tried Tylenol, with some relief. She has previously tried physical therapy, last in 2020 1/2022.  She has also tried corticosteroid injections, last in 2021, which only worked for 1 to 2 days.  No groin pain. She also reports bilateral foot numbness. Patient is Telugu and English speaking.  History: Bilateral Pes Planus, CKD, HLD, Palpitations

## 2024-10-04 NOTE — DISCUSSION/SUMMARY
[de-identified] : Jose Sharif 74-year-old male who presented to the office for follow-up of her right total knee arthroplasty.  Patient underwent right TKA on 2/7/2024. Prior XRays showed right total knee arthroplasty in good position and alignment. Examination showed range of motion 0 to 120. Discussed with the patient the examination and imaging findings. Discussed the management of total knee arthroplasty at this time, including home exercises. Patient will continue home exercises. Patient will participate in activities as tolerated. Patient will follow up in 6 months for reevaluation and management. Patient understanding and in agreement with the plan. All questions answered.     Plan: -Home Exercises -Activities as Tolerated -Follow up in 6 months for reevaluation and management

## 2024-10-04 NOTE — DISCUSSION/SUMMARY
[de-identified] : Jose Sharif 74-year-old male who presented to the office for follow-up of her right total knee arthroplasty.  Patient underwent right TKA on 2/7/2024. Prior XRays showed right total knee arthroplasty in good position and alignment. Examination showed range of motion 0 to 120. Discussed with the patient the examination and imaging findings. Discussed the management of total knee arthroplasty at this time, including home exercises. Patient will continue home exercises. Patient will participate in activities as tolerated. Patient will follow up in 6 months for reevaluation and management. Patient understanding and in agreement with the plan. All questions answered.     Plan: -Home Exercises -Activities as Tolerated -Follow up in 6 months for reevaluation and management

## 2025-01-28 ENCOUNTER — RESULT REVIEW (OUTPATIENT)
Age: 75
End: 2025-01-28

## 2025-01-28 ENCOUNTER — APPOINTMENT (OUTPATIENT)
Dept: ULTRASOUND IMAGING | Facility: IMAGING CENTER | Age: 75
End: 2025-01-28
Payer: MEDICARE

## 2025-01-28 ENCOUNTER — OUTPATIENT (OUTPATIENT)
Dept: OUTPATIENT SERVICES | Facility: HOSPITAL | Age: 75
LOS: 1 days | End: 2025-01-28
Payer: COMMERCIAL

## 2025-01-28 DIAGNOSIS — Z00.8 ENCOUNTER FOR OTHER GENERAL EXAMINATION: ICD-10-CM

## 2025-01-28 DIAGNOSIS — E04.9 NONTOXIC GOITER, UNSPECIFIED: ICD-10-CM

## 2025-01-28 DIAGNOSIS — Z98.49 CATARACT EXTRACTION STATUS, UNSPECIFIED EYE: Chronic | ICD-10-CM

## 2025-01-28 PROCEDURE — 88173 CYTOPATH EVAL FNA REPORT: CPT

## 2025-01-28 PROCEDURE — 88172 CYTP DX EVAL FNA 1ST EA SITE: CPT

## 2025-01-28 PROCEDURE — 10005 FNA BX W/US GDN 1ST LES: CPT

## 2025-01-28 PROCEDURE — 88173 CYTOPATH EVAL FNA REPORT: CPT | Mod: 26

## 2025-07-22 ENCOUNTER — OUTPATIENT (OUTPATIENT)
Dept: OUTPATIENT SERVICES | Facility: HOSPITAL | Age: 75
LOS: 1 days | End: 2025-07-22
Payer: COMMERCIAL

## 2025-07-22 ENCOUNTER — RESULT REVIEW (OUTPATIENT)
Age: 75
End: 2025-07-22

## 2025-07-22 ENCOUNTER — APPOINTMENT (OUTPATIENT)
Dept: RADIOLOGY | Facility: IMAGING CENTER | Age: 75
End: 2025-07-22
Payer: MEDICARE

## 2025-07-22 DIAGNOSIS — Z98.49 CATARACT EXTRACTION STATUS, UNSPECIFIED EYE: Chronic | ICD-10-CM

## 2025-07-22 DIAGNOSIS — Z00.8 ENCOUNTER FOR OTHER GENERAL EXAMINATION: ICD-10-CM

## 2025-07-22 PROCEDURE — 73030 X-RAY EXAM OF SHOULDER: CPT | Mod: 26,LT,RT

## 2025-07-22 PROCEDURE — 77080 DXA BONE DENSITY AXIAL: CPT | Mod: 26

## 2025-07-22 PROCEDURE — 70360 X-RAY EXAM OF NECK: CPT | Mod: 26

## 2025-07-22 PROCEDURE — 77080 DXA BONE DENSITY AXIAL: CPT

## 2025-07-22 PROCEDURE — 73030 X-RAY EXAM OF SHOULDER: CPT

## 2025-07-22 PROCEDURE — 70360 X-RAY EXAM OF NECK: CPT

## (undated) DEVICE — PREP CHLORAPREP HI-LITE ORANGE 26ML

## (undated) DEVICE — SUT VICRYL 0 27" OS-6 UNDYED

## (undated) DEVICE — PACK TOTAL JOINT

## (undated) DEVICE — DRSG CURITY GAUZE SPONGE 4 X 4" 12-PLY

## (undated) DEVICE — TOURNIQUET CUFF 34" DUAL PORT W PLC

## (undated) DEVICE — DRAPE U POLY BLUE 60"X60"

## (undated) DEVICE — SAW BLADE STRYKER SAGITTAL 3 HOLE OSCILLATING

## (undated) DEVICE — DRSG AQUACEL 3.5 X 12"

## (undated) DEVICE — MAKO DRAPE KIT

## (undated) DEVICE — POSITIONER STRAP ARMBOARD VELCRO TS-30

## (undated) DEVICE — HANDPIECE INTERPULSE W/ COAXIAL FAN SPRAY TIP

## (undated) DEVICE — HOOD T5 PEELAWAY

## (undated) DEVICE — SUT QUILL MONODERM 3-0 30CM 24MM

## (undated) DEVICE — MAKO CHECKPOINT KIT FEMORAL / TIBIAL

## (undated) DEVICE — DRSG ACE BANDAGE 6"

## (undated) DEVICE — DRSG COBAN 6"

## (undated) DEVICE — SOL IRR POUR NS 0.9% 1500ML

## (undated) DEVICE — DRSG DERMABOND 0.7ML

## (undated) DEVICE — PACK LIJ BASIC ORTHO

## (undated) DEVICE — TAPE SILK 3"

## (undated) DEVICE — ELCTR BOVIE PENCIL SMOKE EVACUATION

## (undated) DEVICE — DRSG STOCKINETTE IMPERVIOUS XL

## (undated) DEVICE — FRAZIER SUCTION TIP 8FR

## (undated) DEVICE — ELCTR GROUNDING PAD ADULT COVIDIEN

## (undated) DEVICE — STRYKER MIXEVAC 3 BONE CEMENT MIXER

## (undated) DEVICE — SOL IRR BAG NS 0.9% 3000ML

## (undated) DEVICE — WOUND IRR SURGIPHOR

## (undated) DEVICE — SAW BLADE STRYKER THCK LONG 1.24X83.5X18.5MM

## (undated) DEVICE — LABELS BLANK W PEN

## (undated) DEVICE — DRAPE 3/4 SHEET 52X76"

## (undated) DEVICE — MAKO VIZADISC KNEE TRACKING KIT

## (undated) DEVICE — VENODYNE/SCD SLEEVE CALF MEDIUM

## (undated) DEVICE — WARMING BLANKET FULL ADULT

## (undated) DEVICE — TOURNIQUET ESMARK 6"

## (undated) DEVICE — SUT VICRYL 1 36" CTX UNDYED

## (undated) DEVICE — NDL HYPO SAFE 21G X 1.5" (GREEN)

## (undated) DEVICE — PROTECTOR HEEL / ELBOW

## (undated) DEVICE — SYR LUER LOK 20CC

## (undated) DEVICE — SUT QUILL MONODERM 0 36CM 36MM

## (undated) DEVICE — SOL IRR POUR H2O 1500ML

## (undated) DEVICE — SUT QUILL PDO 2 36CM 40MM

## (undated) DEVICE — GOWN XXL

## (undated) DEVICE — GLV 8 PROTEXIS (CREAM) MICRO

## (undated) DEVICE — MAKO BLADE NARROW

## (undated) DEVICE — SUT QUILL PDO 0 45CM 36MM

## (undated) DEVICE — SUCTION YANKAUER NO CONTROL VENT

## (undated) DEVICE — LIJ/LIA-TOURNIQUET #1 4014EABH: Type: DURABLE MEDICAL EQUIPMENT

## (undated) DEVICE — DRAPE SPLIT SHEET 77" X 120"

## (undated) DEVICE — DRAPE EXTREMITY 87" X 106" X 128"

## (undated) DEVICE — DRSG AQUACEL 3.5 X 10"

## (undated) DEVICE — SAW BLADE STRYKER RECIPROCATING DOUBLE EDGE 68X12.5MM